# Patient Record
Sex: FEMALE | Race: WHITE | NOT HISPANIC OR LATINO | Employment: FULL TIME | URBAN - METROPOLITAN AREA
[De-identification: names, ages, dates, MRNs, and addresses within clinical notes are randomized per-mention and may not be internally consistent; named-entity substitution may affect disease eponyms.]

---

## 2023-07-11 ENCOUNTER — HOSPITAL ENCOUNTER (EMERGENCY)
Facility: HOSPITAL | Age: 28
Discharge: HOME | End: 2023-07-11
Attending: EMERGENCY MEDICINE
Payer: COMMERCIAL

## 2023-07-11 ENCOUNTER — APPOINTMENT (EMERGENCY)
Dept: CT IMAGING | Facility: HOSPITAL | Age: 28
End: 2023-07-11
Payer: COMMERCIAL

## 2023-07-11 ENCOUNTER — APPOINTMENT (EMERGENCY)
Dept: ULTRASOUND IMAGING | Facility: HOSPITAL | Age: 28
End: 2023-07-11
Payer: COMMERCIAL

## 2023-07-11 VITALS
DIASTOLIC BLOOD PRESSURE: 80 MMHG | SYSTOLIC BLOOD PRESSURE: 144 MMHG | OXYGEN SATURATION: 97 % | RESPIRATION RATE: 18 BRPM | TEMPERATURE: 98.3 F | HEART RATE: 86 BPM

## 2023-07-11 DIAGNOSIS — R10.9 RIGHT FLANK PAIN: Primary | ICD-10-CM

## 2023-07-11 LAB
ALBUMIN SERPL BCP-MCNC: 4.8 G/DL (ref 3.5–5)
ALP SERPL-CCNC: 64 U/L (ref 34–104)
ALT SERPL W P-5'-P-CCNC: 12 U/L (ref 7–52)
ANION GAP SERPL CALCULATED.3IONS-SCNC: 10 MMOL/L
AST SERPL W P-5'-P-CCNC: 19 U/L (ref 13–39)
BASOPHILS # BLD AUTO: 0.02 THOUSANDS/ÂΜL (ref 0–0.1)
BASOPHILS NFR BLD AUTO: 0 % (ref 0–1)
BILIRUB SERPL-MCNC: 0.45 MG/DL (ref 0.2–1)
BILIRUB UR QL STRIP: NEGATIVE
BUN SERPL-MCNC: 13 MG/DL (ref 5–25)
CALCIUM SERPL-MCNC: 9.8 MG/DL (ref 8.4–10.2)
CHLORIDE SERPL-SCNC: 103 MMOL/L (ref 96–108)
CLARITY UR: CLEAR
CO2 SERPL-SCNC: 23 MMOL/L (ref 21–32)
COLOR UR: COLORLESS
CREAT SERPL-MCNC: 0.84 MG/DL (ref 0.6–1.3)
EOSINOPHIL # BLD AUTO: 0.07 THOUSAND/ÂΜL (ref 0–0.61)
EOSINOPHIL NFR BLD AUTO: 1 % (ref 0–6)
ERYTHROCYTE [DISTWIDTH] IN BLOOD BY AUTOMATED COUNT: 12.6 % (ref 11.6–15.1)
EXT PREGNANCY TEST URINE: NEGATIVE
EXT. CONTROL: NORMAL
GFR SERPL CREATININE-BSD FRML MDRD: 96 ML/MIN/1.73SQ M
GLUCOSE SERPL-MCNC: 91 MG/DL (ref 65–140)
GLUCOSE UR STRIP-MCNC: NEGATIVE MG/DL
HCT VFR BLD AUTO: 40.6 % (ref 34.8–46.1)
HGB BLD-MCNC: 14.5 G/DL (ref 11.5–15.4)
HGB UR QL STRIP.AUTO: NEGATIVE
IMM GRANULOCYTES # BLD AUTO: 0.03 THOUSAND/UL (ref 0–0.2)
IMM GRANULOCYTES NFR BLD AUTO: 0 % (ref 0–2)
KETONES UR STRIP-MCNC: NEGATIVE MG/DL
LEUKOCYTE ESTERASE UR QL STRIP: NEGATIVE
LIPASE SERPL-CCNC: 15 U/L (ref 11–82)
LYMPHOCYTES # BLD AUTO: 2.76 THOUSANDS/ÂΜL (ref 0.6–4.47)
LYMPHOCYTES NFR BLD AUTO: 27 % (ref 14–44)
MCH RBC QN AUTO: 31.5 PG (ref 26.8–34.3)
MCHC RBC AUTO-ENTMCNC: 35.7 G/DL (ref 31.4–37.4)
MCV RBC AUTO: 88 FL (ref 82–98)
MONOCYTES # BLD AUTO: 0.63 THOUSAND/ÂΜL (ref 0.17–1.22)
MONOCYTES NFR BLD AUTO: 6 % (ref 4–12)
NEUTROPHILS # BLD AUTO: 6.91 THOUSANDS/ÂΜL (ref 1.85–7.62)
NEUTS SEG NFR BLD AUTO: 66 % (ref 43–75)
NITRITE UR QL STRIP: NEGATIVE
NRBC BLD AUTO-RTO: 0 /100 WBCS
PH UR STRIP.AUTO: 6 [PH]
PLATELET # BLD AUTO: 396 THOUSANDS/UL (ref 149–390)
PMV BLD AUTO: 9.2 FL (ref 8.9–12.7)
POTASSIUM SERPL-SCNC: 3.6 MMOL/L (ref 3.5–5.3)
PROT SERPL-MCNC: 8.4 G/DL (ref 6.4–8.4)
PROT UR STRIP-MCNC: NEGATIVE MG/DL
RBC # BLD AUTO: 4.61 MILLION/UL (ref 3.81–5.12)
SODIUM SERPL-SCNC: 136 MMOL/L (ref 135–147)
SP GR UR STRIP.AUTO: 1 (ref 1–1.03)
UROBILINOGEN UR STRIP-ACNC: <2 MG/DL
WBC # BLD AUTO: 10.42 THOUSAND/UL (ref 4.31–10.16)

## 2023-07-11 PROCEDURE — 80053 COMPREHEN METABOLIC PANEL: CPT

## 2023-07-11 PROCEDURE — 76705 ECHO EXAM OF ABDOMEN: CPT

## 2023-07-11 PROCEDURE — 81003 URINALYSIS AUTO W/O SCOPE: CPT | Performed by: EMERGENCY MEDICINE

## 2023-07-11 PROCEDURE — G1004 CDSM NDSC: HCPCS

## 2023-07-11 PROCEDURE — 99284 EMERGENCY DEPT VISIT MOD MDM: CPT

## 2023-07-11 PROCEDURE — 36415 COLL VENOUS BLD VENIPUNCTURE: CPT

## 2023-07-11 PROCEDURE — 83690 ASSAY OF LIPASE: CPT

## 2023-07-11 PROCEDURE — 74177 CT ABD & PELVIS W/CONTRAST: CPT

## 2023-07-11 PROCEDURE — 81025 URINE PREGNANCY TEST: CPT

## 2023-07-11 PROCEDURE — 85025 COMPLETE CBC W/AUTO DIFF WBC: CPT

## 2023-07-11 RX ADMIN — IOHEXOL 100 ML: 350 INJECTION, SOLUTION INTRAVENOUS at 21:51

## 2023-07-11 NOTE — ED PROVIDER NOTES
History  Chief Complaint   Patient presents with   • Flank Pain     Patient presents for abdominal and flank pain starting a couple weeks ago. Noticed recently that there was blood in her urine. Also complains of frequency. Patient is a 42-year-old female with no significant PMH that presents to the emergency department with right upper quadrant abdominal pain and right flank pain present for the last 2 weeks. She reports that last night, after eating hamburgers her pain became significantly worse, she felt mildly lightheaded, and felt nauseated. Nausea and lightheadedness resolved today, however she is still feeling right upper quadrant pain. She denies any urgency, or dysuria but does report frequency and hematuria that started today. She denies chest pain, shortness of breath, vomiting, or fevers. She takes no medications other than a daily prenatal vitamin for general.  She denies any abnormal vaginal discharge or bleeding and last menstrual period was June 21. She denies any recent illnesses or injuries. Prior to Admission Medications   Prescriptions Last Dose Informant Patient Reported? Taking?   etonogestrel-ethinyl estradiol (NuvaRing) 0.12-0.015 MG/24HR vaginal ring   No No   Sig: Insert vaginally and leave in place for 3 consecutive weeks, then remove for 1 week. Facility-Administered Medications: None       Past Medical History:   Diagnosis Date   • Allergic rhinitis    • No pertinent past medical history    • Scoliosis        Past Surgical History:   Procedure Laterality Date   • NOSE SURGERY      2015       Family History   Problem Relation Age of Onset   • Anemia Mother    • Glaucoma Father      I have reviewed and agree with the history as documented.     E-Cigarette/Vaping   • E-Cigarette Use Never User      E-Cigarette/Vaping Substances   • Nicotine No    • THC No    • CBD No    • Flavoring No    • Other No    • Unknown No      Social History     Tobacco Use   • Smoking status: Never   • Smokeless tobacco: Never   Vaping Use   • Vaping Use: Never used   Substance Use Topics   • Alcohol use: Yes     Comment: social   • Drug use: No        Review of Systems   Constitutional: Negative for chills and fever. Eyes: Negative for pain and visual disturbance. Respiratory: Negative for cough and shortness of breath. Cardiovascular: Negative for chest pain and palpitations. Gastrointestinal: Positive for abdominal pain (RUQ) and nausea (resolved). Negative for constipation, diarrhea and vomiting. Genitourinary: Positive for frequency and hematuria. Negative for dysuria and urgency. Musculoskeletal: Negative for arthralgias and back pain. Skin: Negative for color change and rash. Neurological: Positive for light-headedness (mild, resolved). Negative for dizziness, seizures, syncope and headaches. All other systems reviewed and are negative. Physical Exam  ED Triage Vitals [07/11/23 1914]   Temperature Pulse Respirations Blood Pressure SpO2   98.3 °F (36.8 °C) 91 18 144/80 100 %      Temp Source Heart Rate Source Patient Position - Orthostatic VS BP Location FiO2 (%)   Oral Monitor Lying Right arm --      Pain Score       --             Orthostatic Vital Signs  Vitals:    07/11/23 1914 07/11/23 1922 07/11/23 2115   BP: 144/80 144/80    Pulse: 91 96 86   Patient Position - Orthostatic VS: Lying         Physical Exam  Vitals and nursing note reviewed. Constitutional:       General: She is not in acute distress. Appearance: Normal appearance. She is well-developed. She is not ill-appearing. HENT:      Head: Normocephalic and atraumatic. Right Ear: External ear normal.      Left Ear: External ear normal.      Mouth/Throat:      Pharynx: Oropharynx is clear. No oropharyngeal exudate or posterior oropharyngeal erythema. Eyes:      General:         Right eye: No discharge. Left eye: No discharge. Extraocular Movements: Extraocular movements intact. Conjunctiva/sclera: Conjunctivae normal.   Cardiovascular:      Rate and Rhythm: Normal rate and regular rhythm. Pulses: Normal pulses. Heart sounds: Normal heart sounds. No murmur heard. Pulmonary:      Effort: Pulmonary effort is normal. No respiratory distress. Breath sounds: Normal breath sounds. No wheezing, rhonchi or rales. Abdominal:      General: Abdomen is flat. Palpations: Abdomen is soft. Tenderness: There is abdominal tenderness (mild, RUQ). There is right CVA tenderness (mild). There is no left CVA tenderness, guarding or rebound. Musculoskeletal:         General: No swelling or deformity. Normal range of motion. Cervical back: Normal range of motion and neck supple. No tenderness. Skin:     General: Skin is warm and dry. Capillary Refill: Capillary refill takes less than 2 seconds. Neurological:      Mental Status: She is alert.          ED Medications  Medications   iohexol (OMNIPAQUE) 350 MG/ML injection (SINGLE-DOSE) 100 mL (100 mL Intravenous Given 7/11/23 2151)       Diagnostic Studies  Results Reviewed     Procedure Component Value Units Date/Time    UA w Reflex to Microscopic w Reflex to Culture [[id number]] Collected: 07/11/23 2043    Lab Status: Final result Specimen: Urine, Clean Catch Updated: 07/11/23 2103     Color, UA Colorless     Clarity, UA Clear     Specific Gravity, UA 1.003     pH, UA 6.0     Leukocytes, UA Negative     Nitrite, UA Negative     Protein, UA Negative mg/dl      Glucose, UA Negative mg/dl      Ketones, UA Negative mg/dl      Urobilinogen, UA <2.0 mg/dl      Bilirubin, UA Negative     Occult Blood, UA Negative    Comprehensive metabolic panel [[id number]] Collected: 07/11/23 1932    Lab Status: Final result Specimen: Blood from Arm, Right Updated: 07/11/23 1957     Sodium 136 mmol/L      Potassium 3.6 mmol/L      Chloride 103 mmol/L      CO2 23 mmol/L      ANION GAP 10 mmol/L      BUN 13 mg/dL      Creatinine 0.84 mg/dL Glucose 91 mg/dL      Calcium 9.8 mg/dL      AST 19 U/L      ALT 12 U/L      Alkaline Phosphatase 64 U/L      Total Protein 8.4 g/dL      Albumin 4.8 g/dL      Total Bilirubin 0.45 mg/dL      eGFR 96 ml/min/1.73sq m     Narrative:      National Kidney Disease Foundation guidelines for Chronic Kidney Disease (CKD):   •  Stage 1 with normal or high GFR (GFR > 90 mL/min/1.73 square meters)  •  Stage 2 Mild CKD (GFR = 60-89 mL/min/1.73 square meters)  •  Stage 3A Moderate CKD (GFR = 45-59 mL/min/1.73 square meters)  •  Stage 3B Moderate CKD (GFR = 30-44 mL/min/1.73 square meters)  •  Stage 4 Severe CKD (GFR = 15-29 mL/min/1.73 square meters)  •  Stage 5 End Stage CKD (GFR <15 mL/min/1.73 square meters)  Note: GFR calculation is accurate only with a steady state creatinine    Lipase [[id number]]  (Normal) Collected: 07/11/23 1932    Lab Status: Final result Specimen: Blood from Arm, Right Updated: 07/11/23 1957     Lipase 15 u/L     CBC and differential [[id number]]  (Abnormal) Collected: 07/11/23 1932    Lab Status: Final result Specimen: Blood from Arm, Right Updated: 07/11/23 1947     WBC 10.42 Thousand/uL      RBC 4.61 Million/uL      Hemoglobin 14.5 g/dL      Hematocrit 40.6 %      MCV 88 fL      MCH 31.5 pg      MCHC 35.7 g/dL      RDW 12.6 %      MPV 9.2 fL      Platelets 513 Thousands/uL      nRBC 0 /100 WBCs      Neutrophils Relative 66 %      Immat GRANS % 0 %      Lymphocytes Relative 27 %      Monocytes Relative 6 %      Eosinophils Relative 1 %      Basophils Relative 0 %      Neutrophils Absolute 6.91 Thousands/µL      Immature Grans Absolute 0.03 Thousand/uL      Lymphocytes Absolute 2.76 Thousands/µL      Monocytes Absolute 0.63 Thousand/µL      Eosinophils Absolute 0.07 Thousand/µL      Basophils Absolute 0.02 Thousands/µL     POCT pregnancy, urine [[id number]]  (Normal) Resulted: 07/11/23 1936    Lab Status: Final result Updated: 07/11/23 1936     EXT Preg Test, Ur Negative     Control Valid CT abdomen pelvis with contrast   Final Result by MALINI Ya MD (07/11 2256)      No acute intra-abdominal pathology. Workstation performed: [id number]          right upper quadrant   Final Result by Evi Casanova DO (07/11 2100)      No acute process seen; normal appearing exam.      Workstation performed: [id number]               Procedures  Procedures      ED Course                                       Medical Decision Making  27F with no significant PMH presents to the emergency department with right upper quadrant abdominal pain and right flank pain present for the last 2 weeks. She developed nausea and worsening of pain last night after eating burgers. On physical exam, patient has mild tenderness to palpation of the right upper quadrant of the abdomen, mild right CVA tenderness, and otherwise normal physical exam.  Laboratory evaluation including CBC, CMP, lipase, urine pregnancy test, and UA all of which are within normal limits other than mildly elevated WBC of 10.4. Right upper quadrant ultrasound reveals no acute process. CT scan of the abdomen pelvis with contrast reveals no acute intra-abdominal pathology. Patient reports that pain is not severe and refuses any pain medications at this time. She is encouraged to call her primary care provider to schedule an appointment for further evaluation and treatment of symptoms, especially if they do not improve. Low suspicion for appendicitis, cholecystitis, hepatic abnormality, pancreatitis, or pregnancy causing pain based on labs and imaging. In the absence of concerning findings on physical exam, laboratory evaluation, or imaging patient is appropriate for discharge at this time. Return precautions are discussed with the patient and they demonstrate understanding of the plan. The patient's questions are all answered to the their satisfaction and the patient is discharged home.       Amount and/or Complexity of Data Reviewed  Radiology: ordered. Risk  Prescription drug management. Disposition  Final diagnoses:   Right flank pain     Time reflects when diagnosis was documented in both MDM as applicable and the Disposition within this note     Time User Action Codes Description Comment    7/11/2023 11:08 PM Daylene Klinefelter Add [R10.9] Right flank pain       ED Disposition     ED Disposition   Discharge    Condition   Stable    Date/Time   Tue Jul 11, 2023 11:07 PM    Comment   Alex Stacy discharge to home/self care. Follow-up Information     Follow up With Specialties Details Why Contact Info Additional [address] Family Medicine Call  To schedule an appointment for further evaluation and treatment of acute and chronic medical conditions [address] [address]  Formerly [facility] [address] [facility], [place], [place], [address]    [address] [facility] Emergency Medicine Go to  If symptoms worsen [address] [facility] Emergency Department, [place], [place], [address]          Discharge Medication List as of 7/11/2023 11:10 PM      CONTINUE these medications which have NOT CHANGED    Details   etonogestrel-ethinyl estradiol (NuvaRing) 0.12-0.015 MG/24HR vaginal ring Insert vaginally and leave in place for 3 consecutive weeks, then remove for 1 week., Normal           No discharge procedures on file. PDMP Review     None           ED Provider  Attending physically available and evaluated Jordin Spine. I managed the patient along with the ED Attending.     Electronically Signed by         Jerel Castorena,   07/12/23 0107

## 2023-07-12 NOTE — ED ATTENDING ATTESTATION
7/11/2023  INava DO, saw and evaluated the patient. I have discussed the patient with the resident/non-physician practitioner and agree with the resident's/non-physician practitioner's findings, Plan of Care, and MDM as documented in the resident's/non-physician practitioner's note, except where noted. All available labs and Radiology studies were reviewed. I was present for key portions of any procedure(s) performed by the resident/non-physician practitioner and I was immediately available to provide assistance. At this point I agree with the current assessment done in the Emergency Department. I have conducted an independent evaluation of this patient a history and physical is as follows:    20-year-old female coming into the ED for evaluation of right-sided abdominal and flank pain, intermittent for the past 2 weeks. He has been worse with some foods. She has had some nausea but no vomiting. PE:  The patient is well appearing, non-toxic, in NAD. Head: normocephalic, atraumatic. HEENT: mucous membranes moist.  Lungs: CTA b/l, no resp distress. Heart: RRR. No M/R/G. Abdomen: RUQ tenderness, R CVA tenderness, ND, no R/R/G. Neuro: CN2-12 intact, GCS 15. Cap refill < 2 sec, skin warm and dry. No rashes or lesions. ED work-up, normal right upper quadrant ultrasound, unremarkable CT abdomen pelvis with IV contrast, labs and urine also negative. Patient stable for discharge home, differential diagnosis, possibly gastritis or ulcers, musculoskeletal pain, less likely cholecystitis, pancreatitis with normal imaging.     ED Course         Critical Care Time  Procedures

## 2023-07-12 NOTE — DISCHARGE INSTRUCTIONS
Read the attached information about abdominal pain for more details and reasons to return to the emergency department    Return to the emergency department if you have significant difficulty breathing, or you notice blood in the stool, vomit, or urine.

## 2023-07-17 DIAGNOSIS — Z30.015 ENCOUNTER FOR INITIAL PRESCRIPTION OF VAGINAL RING HORMONAL CONTRACEPTIVE: ICD-10-CM

## 2023-07-17 RX ORDER — ETONOGESTREL AND ETHINYL ESTRADIOL .12; .015 MG/D; MG/D
RING VAGINAL
Qty: 3 EACH | Refills: 1 | Status: SHIPPED | OUTPATIENT
Start: 2023-07-17

## 2023-10-18 ENCOUNTER — TELEPHONE (OUTPATIENT)
Dept: OBGYN CLINIC | Facility: CLINIC | Age: 28
End: 2023-10-18

## 2023-10-18 NOTE — TELEPHONE ENCOUNTER
Patient called, she was suppose to see you today for a visit. She had a couple questions so the  transferred her to the nurse line. 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 She wanted to know if this was true and if so she wanted to stop the ring. She has a family hx of breast cancer in her grandmother so hearing that information made her nervous.  She believes she does not have the BRACA gene

## 2023-11-17 ENCOUNTER — HOSPITAL ENCOUNTER (OUTPATIENT)
Dept: RADIOLOGY | Facility: HOSPITAL | Age: 28
Discharge: HOME | End: 2023-11-17
Payer: COMMERCIAL

## 2023-11-17 DIAGNOSIS — R10.11 RUQ PAIN: ICD-10-CM

## 2023-11-17 PROCEDURE — G1004 CDSM NDSC: HCPCS

## 2023-11-17 PROCEDURE — A9537 TC99M MEBROFENIN: HCPCS

## 2023-11-17 PROCEDURE — 78227 HEPATOBIL SYST IMAGE W/DRUG: CPT

## 2023-11-17 RX ORDER — SINCALIDE 5 UG/5ML
INJECTION, POWDER, LYOPHILIZED, FOR SOLUTION INTRAVENOUS
Status: COMPLETED
Start: 2023-11-17 | End: 2023-11-17

## 2023-11-17 RX ORDER — WATER 10 ML/10ML
INJECTION INTRAMUSCULAR; INTRAVENOUS; SUBCUTANEOUS
Status: COMPLETED
Start: 2023-11-17 | End: 2023-11-17

## 2023-11-17 RX ORDER — SINCALIDE 5 UG/5ML
0.02 INJECTION, POWDER, LYOPHILIZED, FOR SOLUTION INTRAVENOUS
Status: DISCONTINUED | OUTPATIENT
Start: 2023-11-17 | End: 2023-11-18 | Stop reason: HOSPADM

## 2023-11-17 RX ADMIN — WATER 5 ML: 1 INJECTION INTRAMUSCULAR; INTRAVENOUS; SUBCUTANEOUS at 09:01

## 2023-11-17 RX ADMIN — SINCALIDE 1.5 MCG: 5 INJECTION, POWDER, LYOPHILIZED, FOR SOLUTION INTRAVENOUS at 09:01

## 2024-01-08 DIAGNOSIS — Z30.015 ENCOUNTER FOR INITIAL PRESCRIPTION OF VAGINAL RING HORMONAL CONTRACEPTIVE: ICD-10-CM

## 2024-01-08 RX ORDER — ETONOGESTREL AND ETHINYL ESTRADIOL VAGINAL RING .015; .12 MG/D; MG/D
1 RING VAGINAL
Qty: 3 EACH | Refills: 1 | Status: SHIPPED | OUTPATIENT
Start: 2024-01-08

## 2024-01-08 NOTE — TELEPHONE ENCOUNTER
Pt called requesting a refill of her nuva ring. Pt currently is passed her insertion date by a day. Pt would like it sent to the pharmacy on file. Pt was originally seeing Dr. Corbett but has her annual scheduled with [facility] 1/31/24.

## 2024-01-10 ENCOUNTER — NURSE TRIAGE (OUTPATIENT)
Dept: OTHER | Facility: OTHER | Age: 29
End: 2024-01-10

## 2024-01-11 NOTE — TELEPHONE ENCOUNTER
"Regarding: Nuva Ring question  ----- Message from Lesley Marquez sent at 1/10/2024  6:18 PM EST -----  \"I use the Nuva ring but I was about 3 days late getting it, do I need to wait until my next period or can I use it now?'    "

## 2024-01-11 NOTE — TELEPHONE ENCOUNTER
"Reason for Disposition  • Caller has medicine question only, adult not sick, AND triager answers question    Answer Assessment - Initial Assessment Questions  1. NAME of MEDICATION: \"What medicine are you calling about?\"      NuvaRing  2. QUESTION: \"What is your question?\" (e.g., medication refill, side effect)      Is it okay to put in on Sunday or should I wait until my next cycle    Protocols used: Medication Question Call-ADULT-    "

## 2024-01-31 ENCOUNTER — ANNUAL EXAM (OUTPATIENT)
Dept: OBGYN CLINIC | Facility: CLINIC | Age: 29
End: 2024-01-31
Payer: COMMERCIAL

## 2024-01-31 VITALS
HEIGHT: 62 IN | DIASTOLIC BLOOD PRESSURE: 80 MMHG | BODY MASS INDEX: 28.71 KG/M2 | SYSTOLIC BLOOD PRESSURE: 122 MMHG | WEIGHT: 156 LBS

## 2024-01-31 DIAGNOSIS — Z12.4 CERVICAL CANCER SCREENING: ICD-10-CM

## 2024-01-31 DIAGNOSIS — Z01.419 WELL WOMAN EXAM WITH ROUTINE GYNECOLOGICAL EXAM: ICD-10-CM

## 2024-01-31 DIAGNOSIS — Z12.4 SCREENING FOR MALIGNANT NEOPLASM OF CERVIX: ICD-10-CM

## 2024-01-31 DIAGNOSIS — Z30.015 ENCOUNTER FOR INITIAL PRESCRIPTION OF VAGINAL RING HORMONAL CONTRACEPTIVE: ICD-10-CM

## 2024-01-31 DIAGNOSIS — Z12.39 ENCOUNTER FOR SCREENING BREAST EXAMINATION: ICD-10-CM

## 2024-01-31 DIAGNOSIS — Z01.419 ENCOUNTER FOR WELL WOMAN EXAM: Primary | ICD-10-CM

## 2024-01-31 DIAGNOSIS — Z12.4 ENCOUNTER FOR SCREENING FOR MALIGNANT NEOPLASM OF CERVIX: ICD-10-CM

## 2024-01-31 PROCEDURE — 99395 PREV VISIT EST AGE 18-39: CPT | Performed by: PHYSICIAN ASSISTANT

## 2024-01-31 RX ORDER — LORATADINE 10 MG/1
10 TABLET ORAL DAILY
COMMUNITY

## 2024-01-31 RX ORDER — ETONOGESTREL AND ETHINYL ESTRADIOL VAGINAL RING .015; .12 MG/D; MG/D
1 RING VAGINAL
Qty: 3 EACH | Refills: 4 | Status: SHIPPED | OUTPATIENT
Start: 2024-01-31

## 2024-01-31 NOTE — PROGRESS NOTES
Assessment/Plan:    No problem-specific Assessment & Plan notes found for this encounter.       Diagnoses and all orders for this visit:    Encounter for well woman exam    Encounter for screening breast examination    Cervical cancer screening    Screening for malignant neoplasm of cervix  -     Cancel: IGP, CtNg, rfx Aptima HPV ASCU  -     IGP, CtNg, rfx Aptima HPV ASCU    Encounter for screening for malignant neoplasm of cervix  -     Cancel: Liquid-based pap, screening    Well woman exam with routine gynecological exam  -     Cancel: Liquid-based pap, screening  -     Cancel: IGP, CtNg, rfx Aptima HPV ASCU  -     IGP, CtNg, rfx Aptima HPV ASCU    Encounter for initial prescription of vaginal ring hormonal contraceptive  -     etonogestrel-ethinyl estradiol (EluRyng) 0.12-0.015 MG/24HR vaginal ring; Insert 1 each into the vagina every 28 days Insert vaginally and leave in place for 3 consecutive weeks, then remove for 1 week.    Other orders  -     loratadine (CLARITIN) 10 mg tablet; Take 10 mg by mouth daily          Subjective:      Patient ID: Cale Stacy is a 28 y.o. female.    Pt presents for her annual exam today--  She has no gyn complaints  She has regular bleeding  no pelvic pain  On Nuva Ring  Bowel and bladder are regular  No breast concerns today  +family h/o Breast Ca    pap today.    Rx Nuva Ring  Start PNV  OPK discussed        The following portions of the patient's history were reviewed and updated as appropriate: allergies, current medications, past family history, past medical history, past social history, past surgical history, and problem list.    Review of Systems   Constitutional:  Negative for chills, fever and unexpected weight change.   HENT:  Negative for ear pain and sore throat.    Eyes:  Negative for pain and visual disturbance.   Respiratory:  Negative for cough and shortness of breath.    Cardiovascular:  Negative for chest pain and palpitations.   Gastrointestinal:  Negative  "for abdominal pain, blood in stool, constipation, diarrhea and vomiting.   Genitourinary: Negative.  Negative for dysuria and hematuria.   Musculoskeletal:  Negative for arthralgias and back pain.   Skin:  Negative for color change and rash.   Neurological:  Negative for seizures and syncope.   All other systems reviewed and are negative.        Objective:      /80 (BP Location: Right arm, Patient Position: Sitting, Cuff Size: Standard)   Ht 5' 2\" (1.575 m)   Wt 70.8 kg (156 lb)   LMP 01/01/2024 (Approximate)   BMI 28.53 kg/m²          Physical Exam  Vitals and nursing note reviewed.   Constitutional:       Appearance: Normal appearance. She is well-developed.   HENT:      Head: Normocephalic and atraumatic.   Chest:   Breasts:     Right: No inverted nipple, mass, nipple discharge or skin change.      Left: No inverted nipple, mass, nipple discharge or skin change.   Abdominal:      Palpations: Abdomen is soft.   Genitourinary:     General: Normal vulva.      Exam position: Supine.      Labia:         Right: No rash, tenderness or lesion.         Left: No rash, tenderness or lesion.       Vagina: Normal.      Cervix: No cervical motion tenderness, discharge or friability.      Uterus: Normal.       Adnexa:         Right: No mass, tenderness or fullness.          Left: No mass, tenderness or fullness.     Musculoskeletal:      Cervical back: Normal range of motion.   Lymphadenopathy:      Lower Body: No right inguinal adenopathy. No left inguinal adenopathy.   Neurological:      Mental Status: She is alert.           "

## 2024-02-04 LAB
C TRACH RRNA CVX QL NAA+PROBE: NEGATIVE
CYTOLOGIST CVX/VAG CYTO: NORMAL
DX ICD CODE: NORMAL
N GONORRHOEA RRNA CVX QL NAA+PROBE: NEGATIVE
OTHER STN SPEC: NORMAL
OTHER STN SPEC: NORMAL
PATH REPORT.FINAL DX SPEC: NORMAL
SL AMB NOTE:: NORMAL
SL AMB SPECIMEN ADEQUACY: NORMAL
SL AMB TEST METHODOLOGY: NORMAL

## 2024-04-02 ENCOUNTER — NURSE TRIAGE (OUTPATIENT)
Age: 29
End: 2024-04-02

## 2024-04-02 NOTE — TELEPHONE ENCOUNTER
"Patient called states she is almost 5 weeks pregnant and experiencing mild abdominal cramping on and off. Patient states feels like period cramps, but occur mostly with BM. Patient denies any vaginal bldg or unilateral pain. Care advice reviewed. Patient aware to call back if symptoms worsen or experiences any vaginal bleeding, unilateral pain, or mod-severe abdominal pain. Patient verbalized understanding. No further questions at this time.     Reason for Disposition   MILD abdominal pain (e.g., doesn't interfere with normal activities)    Answer Assessment - Initial Assessment Questions  1. LOCATION: \"Where does it hurt?\"       Feels like period cramps  2. RADIATION: \"Does the pain shoot anywhere else?\" (e.g., chest, back, shoulder)      While having a BM radiates to rectum  3. ONSET: \"When did the pain begin?\" (e.g., minutes, hours or days ago)       Been ongoing since haven't gotten period. Comes and goes. Mainly comes   4. ONSET: \"Gradual or sudden onset?\"      gradual  5. PATTERN \"Does the pain come and go, or has it been constant since it started?\"       Comes and goes  6. SEVERITY: \"How bad is the pain?\" \"What does it keep you from doing?\"  (e.g., Scale 1-10; mild, moderate, or severe)    - MILD (1-3): doesn't interfere with normal activities, abdomen soft and not tender to touch     - MODERATE (4-7): interferes with normal activities or awakens from sleep, tender to touch     - SEVERE (8-10): excruciating pain, doubled over, unable to do any normal activities      3-4/10   7. RECURRENT SYMPTOM: \"Have you ever had this type of stomach pain before?\" If Yes, ask: \"When was the last time?\" and \"What happened that time?\"       Typically when having period  8. CAUSE: \"What do you think is causing the stomach pain?\"      unknown  9. RELIEVING/AGGRAVATING FACTORS: \"What makes it better or worse?\" (e.g., antacids, bowel movement, movement)      Bowel movement makes worse  10. OTHER SYMPTOMS: \"Has there been any " "vaginal bleeding, fever, vomiting, diarrhea, or urine problems?\"        denies  11. CHEKO: \"What date are you expecting to deliver?\"        LMP 2/28/24    Protocols used: Pregnancy - Abdominal Pain Less Than 20 Weeks EGA-ADULT-[place]    "

## 2024-05-06 ENCOUNTER — ULTRASOUND (OUTPATIENT)
Dept: OBGYN CLINIC | Facility: CLINIC | Age: 29
End: 2024-05-06
Payer: COMMERCIAL

## 2024-05-06 VITALS
HEIGHT: 62 IN | WEIGHT: 156.2 LBS | BODY MASS INDEX: 28.74 KG/M2 | SYSTOLIC BLOOD PRESSURE: 138 MMHG | DIASTOLIC BLOOD PRESSURE: 76 MMHG

## 2024-05-06 DIAGNOSIS — N91.2 AMENORRHEA: Primary | ICD-10-CM

## 2024-05-06 PROCEDURE — 76817 TRANSVAGINAL US OBSTETRIC: CPT | Performed by: PHYSICIAN ASSISTANT

## 2024-05-06 PROCEDURE — 99214 OFFICE O/P EST MOD 30 MIN: CPT | Performed by: PHYSICIAN ASSISTANT

## 2024-05-06 NOTE — PROGRESS NOTES
Assessment/Plan:  - Viable IUP @ 9w 6d EGA  - CHEKO 12-3-2024  - Continue PNV  - Patient to call for concerns  - RTO 3 weeks for OB intake    Encounter Diagnosis     ICD-10-CM    1. Amenorrhea  N91.2 Ambulatory Referral to Maternal Fetal Medicine     [facility] OB < 14 weeks single or first gestation level 1              Subjective:       Patient ID: Cale Stacy [id number]        Cale Stacy is a 28 y.o.  presenting to the office for pregnancy confirmation. Patient's last menstrual period was 2024 (exact date). , placing her at 9w6d today with CHEKO of 12-3-. She is feeling ok today.         OB History    Para Term  AB Living   1 0 0 0 0 0   SAB IAB Ectopic Multiple Live Births   0 0 0 0 0      # Outcome Date GA Lbr Dominic/2nd Weight Sex Delivery Anes PTL Lv   1 Current               Obstetric Comments   Menarche 12         The following portions of the patient's history were reviewed and updated as appropriate: allergies, current medications, past family history, past medical history, past social history, past surgical history, and problem list.    Allergies:  Augmentin [amoxicillin-pot clavulanate]    Medications:    Current Outpatient Medications:     loratadine (CLARITIN) 10 mg tablet, Take 10 mg by mouth daily, Disp: , Rfl:     Prenatal Vit-Fe Fumarate-FA (PRENATAL PO), Take by mouth, Disp: , Rfl:     etonogestrel-ethinyl estradiol (EluRyng) 0.12-0.015 MG/24HR vaginal ring, Insert 1 each into the vagina every 28 days Insert vaginally and leave in place for 3 consecutive weeks, then remove for 1 week. (Patient not taking: Reported on 2024), Disp: 3 each, Rfl: 4      Review of Systems:   Review of Systems   Constitutional:  Negative for fever.   Respiratory:  Negative for shortness of breath.    Cardiovascular:  Negative for chest pain.   Gastrointestinal:  Negative for abdominal pain.   Genitourinary:  Negative for vaginal bleeding.   Skin:  Negative for rash.         "  Objective:       Visit Vitals  /76 (BP Location: Left arm, Patient Position: Sitting, Cuff Size: Standard)   Ht 5' 2\" (1.575 m)   Wt 70.9 kg (156 lb 3.2 oz)   LMP 02/27/2024 (Exact Date)   BMI 28.57 kg/m²   OB Status Pregnant   Smoking Status Never   BSA 1.72 m²        GEN: The patient was alert and oriented x3, pleasant well-appearing female in no acute distress.   PULM: nonlabored respirations  MSK: Normal gait  : WNl  Skin: warm, dry  Neuro: no focal deficits  Psych: normal affect and judgement, cooperative    Ultrasound:     Viability US     Gestational sac: present               Location: intrauterine  Yolk sac: present  Fetal pole: present               CRL: 2.87 cm = 9w4d  Cardiac activity: present               Rate: 176 bpm     Ovaries: normal appearing bilaterally  Cul de sac: absence of free fluid  Uterus: normal in appearance           Ultrasound Probe Disinfection    A transvaginal ultrasound was performed.   Prior to use, disinfection was performed with High Level Disinfection Process (Trophon)  Probe serial number [id number]: [id number] was used    Meredith Núñez PA-C  05/06/24  2:42 PM    I have spent a total time of 30 minutes on 05/06/24 in caring for this patient including Patient and family education, Documenting in the medical record, Reviewing / ordering tests, medicine, procedures  , and Obtaining or reviewing history  .        "

## 2024-05-14 ENCOUNTER — INITIAL PRENATAL (OUTPATIENT)
Dept: OBGYN CLINIC | Facility: CLINIC | Age: 29
End: 2024-05-14

## 2024-05-14 VITALS
RESPIRATION RATE: 16 BRPM | DIASTOLIC BLOOD PRESSURE: 60 MMHG | SYSTOLIC BLOOD PRESSURE: 106 MMHG | WEIGHT: 155.6 LBS | OXYGEN SATURATION: 98 % | HEIGHT: 62 IN | HEART RATE: 75 BPM | BODY MASS INDEX: 28.63 KG/M2

## 2024-05-14 DIAGNOSIS — Z3A.11 11 WEEKS GESTATION OF PREGNANCY: Primary | ICD-10-CM

## 2024-05-14 DIAGNOSIS — Z31.430 ENCNTR FEM FOR TEST FOR GENETC DIS CARRIER STAT FOR PRO MGMT: ICD-10-CM

## 2024-05-14 DIAGNOSIS — Z34.00 SUPERVISION OF NORMAL FIRST PREGNANCY, ANTEPARTUM: ICD-10-CM

## 2024-05-14 PROCEDURE — OBC

## 2024-05-14 NOTE — PATIENT INSTRUCTIONS
Congratulations!! Please review our Pregnancy Essential Guide and [facility] L&D Virtual tour from our networks website.     [facility] Pregnancy Essentials Guide  [facility] ([email or web address])     [facility] ([facility])

## 2024-05-14 NOTE — PROGRESS NOTES
OB INTAKE INTERVIEW  Patient is 28 y.o. who presents for OB intake at 11wks 0days  The father of her baby (Joni) is involved in the pregnancy.    Cale is a pleasant . She has a history of scoliosis (pt reports the thoracic region) and has not had any complications with it. She offered no concerns during today's intake. Reviewed her next appointment date, time, and what to expect.     Last Menstrual Period: 2024  Ultrasound: Measured 9 weeks 4 days on 2024  Estimated Date of Delivery: 2024 confirmed by US    PLAN-  -Prenatal labs ordered  -Referral placed for MFM  -Reviewed Genetic Testing options   -Ordered SMA/CF/HgB [place]  -Patient to contact the office with any concerns  -RTO for OB visit (pap not indicated, last completed 2024)    Signs/Symptoms of Pregnancy  Current pregnancy symptoms:   Nausea NO  Vomiting NO  Breast tenderness YES  Fatigue YES  Constipation YES, advised to increase fluids, fiber, ambulation, Colace  Headaches no  Cramping/spotting no, had cramping around 5-6 weeks but subsided  PICA cravings no    Diabetes-  Body mass index is 28.46 kg/m².  If patient has 1 or more, please order early 1 hour GTT  History of GDM no  BMI >35 no  History of PCOS or current metformin use no  History of LGA/macrosomic infant (4000g/9lbs) no    If patient has 2 or more, please order early 1 hour GTT  BMI>30 no  AMA no  First degree relative with type 2 diabetes YES  History of chronic HTN, hyperlipidemia, elevated A1C no  High risk race (, , ,  or ) no    Hypertension- if you answer yes to any of the following, please order baseline preeclampsia labs (cbc, comprehensive metabolic panel, urine protein creatinine ratio, uric acid)  History of of chronic HTN no  History of gestational HTN no  History of preeclampsia, eclampsia, or HELLP syndrome no  History of diabetes no  History of lupus, autoimmune disease, kidney disease  no    Thyroid- if yes order TSH with reflex T4  History of thyroid disease no    Bleeding Disorder or Hx of DVT-patient or first degree relative with history of. Order the following if not done previously.   (Factor V, antithrombin III, prothrombin gene mutation, protein C and S Ag, lupus anticoagulant, anticardiolipin, beta-2 glycoprotein)   no    OB/GYN-  History of abnormal pap smear no       Date of last pap smear 2024  History of HPV no  History of Herpes/HSV no  History of other STI (gonorrhea, chlamydia, trich) no  History of prior  no  History of prior  no  History of  delivery prior to 36 weeks 6 days no  History of blood transfusion no  Ok for blood transfusion YES    Substance screening-   History of tobacco use no  Currently using tobacco no  Substance Use Screen Level (N/A, LOW, HIGH) NO Risk    MRSA Screening-   Does the pt have a hx of MRSA? no    Immunizations:  Discussed Tdap vaccine YES  Discussed COVID Vaccine YES    Genetic/MFM-  Do you or your partner have a history of any of the following in yourselves or first degree relatives?  Cystic fibrosis no  Spinal muscular atrophy no  Hemoglobinopathy/Sickle Cell/Thalassemia no  Fragile X Intellectual Disability no    If yes, discuss Carrier Screening and recommend consultation with MFM/Genetic Counseling and place specific [facility] Referral for.    If no, discuss Carrier Screening being completed once in a lifetime as a standard of care lab test. Place orders for Cystic Fibrosis Gene Test ([id number]) and Spinal Muscular Atrophy DNA ([id number])      Appointment for Nuchal Translucency Ultrasound at [facility] scheduled for 2024      Interview education  [facility] Pregnancy Essentials Book reviewed, discussed and attached to their AVS YES    Nurse/Family Partnership- patient may qualify NO; referral placed NO    Prenatal lab work scripts YES  Extra labs ordered:  Iron Panel    Aspirin/Preeclampsia Screen    Risk Level Risk Factor  Recommendation   LOW Prior Uncomplicated full-term delivery no No Aspirin recommendation        MODERATE Nulliparity YES Recommend low-dose aspirin if     BMI>30 no 2 or more moderate risk factors    Family History Preeclampsia (mother/sister) no     35yr old or greater no      or Low Socioeconomic no     IVF Pregnancy  no     Personal History Risks (low birth weight, prior adverse preg outcome, >10yr preg interval) no         HIGH History of Preeclampsia no Recommend low-dose aspirin if     Multifetal gestation no 1 or more high risk factors    Chronic HTN no     Type 1 or 2 Diabetes no     Renal Disease no     Autoimmune Disease  no      Contraindications to ASA therapy:  NSAID/ ASA allergy: no  Nasal polyps: no  Asthma with history of ASA induced bronchospasm: no  Relative contraindications:  History of GI bleed: no  Active peptic ulcer disease: no  Severe hepatic dysfunction: no    Patient should be recommended to take ASA 162mg during this pregnancy from 12-36wks to lower her risk of preeclampsia: Reviewed benefits of ASA therapy with Cale. Aware that she has only one risk factor and that ASA therapy is not recommended at this time.    PN1 visit scheduled. The patient was oriented to our practice, the navigator role, reviewed delivering physicians and the [facility] for Delivery. All questions were answered.    Interviewed by: Imani Jerome RN

## 2024-05-15 ENCOUNTER — TELEPHONE (OUTPATIENT)
Age: 29
End: 2024-05-15

## 2024-05-15 NOTE — TELEPHONE ENCOUNTER
Pt calling with questions regarding obtaining lab work. RN answered questions. Pt verbalized an understanding.

## 2024-05-18 ENCOUNTER — LAB (OUTPATIENT)
Dept: LAB | Facility: CLINIC | Age: 29
End: 2024-05-18
Payer: COMMERCIAL

## 2024-05-18 DIAGNOSIS — Z31.430 ENCNTR FEM FOR TEST FOR GENETC DIS CARRIER STAT FOR PRO MGMT: ICD-10-CM

## 2024-05-18 DIAGNOSIS — Z3A.11 11 WEEKS GESTATION OF PREGNANCY: ICD-10-CM

## 2024-05-18 DIAGNOSIS — Z34.00 SUPERVISION OF NORMAL FIRST PREGNANCY, ANTEPARTUM: ICD-10-CM

## 2024-05-18 LAB
ABO GROUP BLD: NORMAL
BASOPHILS # BLD AUTO: 0.03 THOUSANDS/ÂΜL (ref 0–0.1)
BASOPHILS NFR BLD AUTO: 0 % (ref 0–1)
BLD GP AB SCN SERPL QL: NEGATIVE
EOSINOPHIL # BLD AUTO: 0.13 THOUSAND/ÂΜL (ref 0–0.61)
EOSINOPHIL NFR BLD AUTO: 1 % (ref 0–6)
ERYTHROCYTE [DISTWIDTH] IN BLOOD BY AUTOMATED COUNT: 12.9 % (ref 11.6–15.1)
FERRITIN SERPL-MCNC: 91 NG/ML (ref 11–307)
HBV SURFACE AG SER QL: NORMAL
HCT VFR BLD AUTO: 38.8 % (ref 34.8–46.1)
HCV AB SER QL: NORMAL
HGB BLD-MCNC: 13.7 G/DL (ref 11.5–15.4)
HIV 1+2 AB+HIV1 P24 AG SERPL QL IA: NORMAL
HIV 2 AB SERPL QL IA: NORMAL
HIV1 AB SERPL QL IA: NORMAL
HIV1 P24 AG SERPL QL IA: NORMAL
IMM GRANULOCYTES # BLD AUTO: 0.03 THOUSAND/UL (ref 0–0.2)
IMM GRANULOCYTES NFR BLD AUTO: 0 % (ref 0–2)
IRON SATN MFR SERPL: 43 % (ref 15–50)
IRON SERPL-MCNC: 173 UG/DL (ref 50–212)
LYMPHOCYTES # BLD AUTO: 2.4 THOUSANDS/ÂΜL (ref 0.6–4.47)
LYMPHOCYTES NFR BLD AUTO: 26 % (ref 14–44)
MCH RBC QN AUTO: 31.5 PG (ref 26.8–34.3)
MCHC RBC AUTO-ENTMCNC: 35.3 G/DL (ref 31.4–37.4)
MCV RBC AUTO: 89 FL (ref 82–98)
MONOCYTES # BLD AUTO: 0.52 THOUSAND/ÂΜL (ref 0.17–1.22)
MONOCYTES NFR BLD AUTO: 6 % (ref 4–12)
NEUTROPHILS # BLD AUTO: 6.05 THOUSANDS/ÂΜL (ref 1.85–7.62)
NEUTS SEG NFR BLD AUTO: 67 % (ref 43–75)
NRBC BLD AUTO-RTO: 0 /100 WBCS
PLATELET # BLD AUTO: 338 THOUSANDS/UL (ref 149–390)
PMV BLD AUTO: 9.1 FL (ref 8.9–12.7)
RBC # BLD AUTO: 4.35 MILLION/UL (ref 3.81–5.12)
RH BLD: POSITIVE
RUBV IGG SERPL IA-ACNC: 51.6 IU/ML
SPECIMEN EXPIRATION DATE: NORMAL
TIBC SERPL-MCNC: 407 UG/DL (ref 250–450)
TREPONEMA PALLIDUM IGG+IGM AB [PRESENCE] IN SERUM OR PLASMA BY IMMUNOASSAY: NORMAL
UIBC SERPL-MCNC: 234 UG/DL (ref 155–355)
VZV IGG SER QL IA: NORMAL
WBC # BLD AUTO: 9.16 THOUSAND/UL (ref 4.31–10.16)

## 2024-05-18 PROCEDURE — 86901 BLOOD TYPING SEROLOGIC RH(D): CPT

## 2024-05-18 PROCEDURE — 87389 HIV-1 AG W/HIV-1&-2 AB AG IA: CPT

## 2024-05-18 PROCEDURE — 86787 VARICELLA-ZOSTER ANTIBODY: CPT

## 2024-05-18 PROCEDURE — 83540 ASSAY OF IRON: CPT

## 2024-05-18 PROCEDURE — 86803 HEPATITIS C AB TEST: CPT

## 2024-05-18 PROCEDURE — 87340 HEPATITIS B SURFACE AG IA: CPT

## 2024-05-18 PROCEDURE — 83550 IRON BINDING TEST: CPT

## 2024-05-18 PROCEDURE — 86850 RBC ANTIBODY SCREEN: CPT

## 2024-05-18 PROCEDURE — 83020 HEMOGLOBIN ELECTROPHORESIS: CPT

## 2024-05-18 PROCEDURE — 86900 BLOOD TYPING SEROLOGIC ABO: CPT

## 2024-05-18 PROCEDURE — 81220 CFTR GENE COM VARIANTS: CPT

## 2024-05-18 PROCEDURE — 36415 COLL VENOUS BLD VENIPUNCTURE: CPT

## 2024-05-18 PROCEDURE — 86762 RUBELLA ANTIBODY: CPT

## 2024-05-18 PROCEDURE — 85025 COMPLETE CBC W/AUTO DIFF WBC: CPT

## 2024-05-18 PROCEDURE — 81329 SMN1 GENE DOS/DELETION ALYS: CPT

## 2024-05-18 PROCEDURE — 86780 TREPONEMA PALLIDUM: CPT

## 2024-05-18 PROCEDURE — 82728 ASSAY OF FERRITIN: CPT

## 2024-05-18 PROCEDURE — 87086 URINE CULTURE/COLONY COUNT: CPT

## 2024-05-19 LAB — BACTERIA UR CULT: NORMAL

## 2024-05-21 ENCOUNTER — ROUTINE PRENATAL (OUTPATIENT)
Dept: PERINATAL CARE | Facility: CLINIC | Age: 29
End: 2024-05-21
Payer: COMMERCIAL

## 2024-05-21 VITALS
BODY MASS INDEX: 29 KG/M2 | DIASTOLIC BLOOD PRESSURE: 60 MMHG | HEIGHT: 62 IN | WEIGHT: 157.6 LBS | HEART RATE: 75 BPM | SYSTOLIC BLOOD PRESSURE: 120 MMHG

## 2024-05-21 DIAGNOSIS — Z36.9 UNSPECIFIED ANTENATAL SCREENING: ICD-10-CM

## 2024-05-21 DIAGNOSIS — Z36.82 ENCOUNTER FOR NUCHAL TRANSLUCENCY TESTING: Primary | ICD-10-CM

## 2024-05-21 DIAGNOSIS — O36.80X0 ENCOUNTER TO DETERMINE FETAL VIABILITY OF PREGNANCY, SINGLE OR UNSPECIFIED FETUS: ICD-10-CM

## 2024-05-21 DIAGNOSIS — Z13.79 ENCOUNTER FOR OTHER SCREENING FOR GENETIC AND CHROMOSOMAL ANOMALIES: ICD-10-CM

## 2024-05-21 DIAGNOSIS — Z3A.12 12 WEEKS GESTATION OF PREGNANCY: ICD-10-CM

## 2024-05-21 DIAGNOSIS — Z33.1 PREGNANT STATE, INCIDENTAL: ICD-10-CM

## 2024-05-21 PROCEDURE — 76801 OB US < 14 WKS SINGLE FETUS: CPT | Performed by: OBSTETRICS & GYNECOLOGY

## 2024-05-21 PROCEDURE — 76813 OB US NUCHAL MEAS 1 GEST: CPT | Performed by: OBSTETRICS & GYNECOLOGY

## 2024-05-21 PROCEDURE — 99203 OFFICE O/P NEW LOW 30 MIN: CPT | Performed by: OBSTETRICS & GYNECOLOGY

## 2024-05-21 PROCEDURE — 36415 COLL VENOUS BLD VENIPUNCTURE: CPT | Performed by: OBSTETRICS & GYNECOLOGY

## 2024-05-21 NOTE — PROGRESS NOTES
"Cale presents today for a genetic screening ultrasound.  This is her first pregnancy.  She has no significant medical, surgical, substance use, or family history.  A review of systems is otherwise negative.    We discussed the options for genetic screening, including but not limited to first trimester screening, second trimester screening, combined first and second trimester screening, noninvasive prenatal screening (NIPS) for patients at high risk and diagnostic screening through the use of CVS and amniocentesis. We discussed the risks and benefits of each approach including the sensitivities and false positive rates as well as the difference between a screening test and a diagnostic test. At the conclusion of our discussion the patient elected noninvasive prenatal screening utilizing the MaterniT 21 plus test. The patient had this bloowork drawn in the office.   The results should be available in approximately 7-10 days.    The patient had questions regarding the safety of exercise in pregnancy and overall I reviewed that exercise is safe and encouraged in pregnancy as long as there are no complications such as fetal growth restriction or  labor. I reiterated that there is no evidence to suggest that a mom needs to keep her heart rate bellow a certain level for fetal well being and that moderate exercise is beneficial for both maternal and fetal well being.    We discussed follow-up in detail and I recommend an anatomy ultrasound be scheduled for 20 weeks gestation.    Thank you very much for allowing us to participate in the care of this very nice patient. Should you have any questions, please do not hesitate to contact me.    Portions of the record may have been created with voice recognition software. Occasional wrong word or \"sound a like\" substitutions may have occurred due to the inherent limitations of voice recognition software. Read the chart carefully and recognize, using context, where " substitutions have occurred.

## 2024-05-21 NOTE — PROGRESS NOTES
Patient chose to have [facility] [id number] Non-Invasive Prenatal Screen [id number] PLUS, NO SCA, NO fetal sex.  Patient choose to be billed through insurance.     Patient given brochure and is aware [facility] will contact patient's insurance and coordinate coverage.  Provided [facility] contact information. General inquiries [phone], Cost estimates [phone] and [facility] Billing [phone]. Website [email or web address].     Blood collection tubes labeled with patient identifiers (name, medical record number, and date of birth).     Filled out [facility] order form. Patient chose to have blood drawn in our office at time of visit. NIPS was drawn from left arm with a straight needle by PATRICIA Gonzalez RNC-OB . .        Maternal Fetal Medicine will have results in approximately 5-7 business days and will call patient or notify via G[employer]VIDI.  Patient aware viewing lab result online will reveal fetal sex if ordered.    Patient verbalized understanding of all instructions and no questions at this time.

## 2024-05-22 LAB
CFDNA.FET/CFDNA.TOTAL SFR FETUS: NORMAL %
CITATION REF LAB TEST: NORMAL
FET 13+18+21+X+Y ANEUP PLAS.CFDNA: NORMAL
FET CHR 21 TS PLAS.CFDNA QL: NORMAL
FET CHR 21 TS PLAS.CFDNA QL: NORMAL
FET SEX PLAS.CFDNA DOSAGE CFDNA: NORMAL
FET TS 13 RISK PLAS.CFDNA QL: NORMAL
GA EST FROM CONCEPTION DATE: NORMAL D
GESTATIONAL AGE > 9:: NORMAL
LAB DIRECTOR NAME PROVIDER: NORMAL
LAB DIRECTOR NAME PROVIDER: NORMAL
LABORATORY COMMENT REPORT: NORMAL
LIMITATIONS OF THE TEST: NORMAL
NEGATIVE PREDICTIVE VALUE: NORMAL
PERFORMANCE CHARACTERISTICS: NORMAL
POSITIVE PREDICTIVE VALUE: NORMAL
REF LAB TEST METHOD: NORMAL
SL AMB NOTE:: NORMAL
TEST PERFORMANCE INFO SPEC: NORMAL

## 2024-05-23 LAB
CITATION REF LAB TEST: NORMAL
CLINICAL INFO: NORMAL
ETHNIC BACKGROUND STATED: NORMAL
GENE DIS ANL CARRIER INTERP-IMP: NORMAL
GENE MUT TESTED BLD/T: NORMAL
HGB A MFR BLD: 2.5 % (ref 1.8–3.2)
HGB A MFR BLD: 97.5 % (ref 96.4–98.8)
HGB F MFR BLD: 0 % (ref 0–2)
HGB FRACT BLD-IMP: NORMAL
HGB S MFR BLD: 0 %
LAB DIRECTOR NAME PROVIDER: NORMAL
REASON FOR REFERRAL (NARRATIVE): NORMAL
RECOMMENDATION PATIENT DOC-IMP: NORMAL
REF LAB TEST METHOD: NORMAL
SERVICE CMNT-IMP: NORMAL
SMN1 GENE MUT ANL BLD/T: NORMAL
SPECIMEN SOURCE: NORMAL

## 2024-05-27 LAB
CFDNA.FET/CFDNA.TOTAL SFR FETUS: NORMAL %
CITATION REF LAB TEST: NORMAL
FET 13+18+21+X+Y ANEUP PLAS.CFDNA: NEGATIVE
FET CHR 21 TS PLAS.CFDNA QL: NEGATIVE
FET CHR 21 TS PLAS.CFDNA QL: NEGATIVE
FET SEX PLAS.CFDNA DOSAGE CFDNA: NORMAL
FET TS 13 RISK PLAS.CFDNA QL: NEGATIVE
GA EST FROM CONCEPTION DATE: NORMAL D
GESTATIONAL AGE > 9:: YES
LAB DIRECTOR NAME PROVIDER: NORMAL
LAB DIRECTOR NAME PROVIDER: NORMAL
LABORATORY COMMENT REPORT: NORMAL
LIMITATIONS OF THE TEST: NORMAL
NEGATIVE PREDICTIVE VALUE: NORMAL
PERFORMANCE CHARACTERISTICS: NORMAL
POSITIVE PREDICTIVE VALUE: NORMAL
REF LAB TEST METHOD: NORMAL
SL AMB NOTE:: NORMAL
TEST PERFORMANCE INFO SPEC: NORMAL

## 2024-05-28 ENCOUNTER — TELEPHONE (OUTPATIENT)
Age: 29
End: 2024-05-28

## 2024-05-28 NOTE — TELEPHONE ENCOUNTER
Patient called asking if she was allowed to do water rides at a water park while pregnant.  She was advised she is not allowed to ride any rides while pregnant.  She understood and had no additional questions or concerns.

## 2024-05-28 NOTE — RESULT ENCOUNTER NOTE
I have reviewed the results of the NIPS which are low risk.  Please call patient and notify her of these reassuring results if she has not viewed on MyChart. Please ensure she is notified of recommendation of MSAFP to be ordered and followed up through her primary Obstetrician's office.      Thank you, Ac Torres MD

## 2024-05-30 LAB
CFTR FULL MUT ANL BLD/T SEQ: NORMAL
CITATION REF LAB TEST: NORMAL
CLINICAL INFO: NORMAL
ETHNIC BACKGROUND STATED: NORMAL
GENE DIS ANL CARRIER INTERP-IMP: NORMAL
INDICATION: NORMAL
LAB DIRECTOR NAME PROVIDER: NORMAL
RECOMMENDATION PATIENT DOC-IMP: NORMAL
REF LAB TEST METHOD: NORMAL
SERVICE CMNT-IMP: NORMAL
SPECIMEN SOURCE: NORMAL

## 2024-06-04 ENCOUNTER — NURSE TRIAGE (OUTPATIENT)
Age: 29
End: 2024-06-04

## 2024-06-04 NOTE — TELEPHONE ENCOUNTER
"Pt called in stating that for the last two days she has had a decreased appetite. Then this morning she woke up around 0600, had a large bowel movement and then felt dizzy. She went back to bed until 0700 but states the dizziness continued and she vomited while on her way to work. Pt denies having any nausea/vomiting throughout the pregnancy prior. Pt states that she feels better at the moment but is going to go home and rest. Advised to maintain hydration and if her stomach tolerates it, incorporate small bland snacks. Pt aware to call back if she experiences more vomiting and cannot keep liquids down, is not urinating regularly or has other concerns/worsening symptoms. Pt thankful. Pt also states that she has a dentist appointment tomorrow and is wondering if she can have xrays done. Advised to only do them if necessary and to double shield. She verbalized understanding and is thankful.      Reason for Disposition   Nausea or vomiting    Answer Assessment - Initial Assessment Questions  1. VOMITING SEVERITY: \"How many times have you vomited in the past 24 hours?\"      - MILD:  1 - 2 times/day     - MODERATE: 3 - 5 times/day, decreased oral intake without significant weight loss or symptoms of dehydration     - SEVERE: 6 or more times/day, vomits everything or nearly everything, with significant weight loss, symptoms of dehydration       once  2. ONSET: \"When did the vomiting begin?\"       This morning  3. FLUIDS: \"What fluids or food have you vomited up today?\" \"Are you able to keep any liquids down?\"      Sipping at water  4. TREATMENT: \"What have you been doing so far to treat this?\"       nothing  5. DEHYDRATION: \"When was the last time you urinated?\" \"Are you feeling lightheaded?\" \"Weight loss?\"      This morning  6. PREGNANCY: \"How many weeks pregnant are you?\" \"How has the pregnancy been going?\"      14w0d  7. CHEKO: \"What date are you expecting to deliver?\"      12/3/24  8. MEDICATIONS: \"What medications are " "you taking?\" (e.g., prenatal vitamins, iron)      prenatal  9. OTHER SYMPTOMS: \"Do you have any other symptoms?\"      dizziness    Protocols used: Pregnancy - Morning Sickness (Nausea and Vomiting of Pregnancy)-ADULT-OH    "

## 2024-06-05 NOTE — TELEPHONE ENCOUNTER
Pt called to update from phone call yesterday. States continued vomiting after our phone call. States she vomited throughout the day and last night. Pt does not think this is pregnancy related, thinks it may be a stomach bug. Has vomited x6 in the last 24 hours, last vomited in the middle of the night. Urinated shortly prior to call, states her urination has been normal. Taking sips of water since around 0600 and ate toast which she has kept down. States she does feel better at this time and does not think she will vomit anymore. Denies fever but does report some mild nasal congestion/post nasal drip. Started to feel flutters last night, unsure if from baby or from vomiting. Advised that is good if she is starting to feel better, if it is a stomach bug they typically last 24-48 hours. Pt aware to call back if vomiting persists today or worsens. Also aware to call back if she is not able to stay hydrated, feels dizzy/lightheaded, has any pains or vaginal bleeding. Pt verbalized understanding and is thankful.

## 2024-06-06 ENCOUNTER — INITIAL PRENATAL (OUTPATIENT)
Dept: OBGYN CLINIC | Facility: CLINIC | Age: 29
End: 2024-06-06

## 2024-06-06 VITALS
WEIGHT: 154 LBS | SYSTOLIC BLOOD PRESSURE: 106 MMHG | HEIGHT: 62 IN | DIASTOLIC BLOOD PRESSURE: 64 MMHG | BODY MASS INDEX: 28.34 KG/M2

## 2024-06-06 DIAGNOSIS — Z34.00 SUPERVISION OF NORMAL FIRST PREGNANCY, ANTEPARTUM: Primary | ICD-10-CM

## 2024-06-06 DIAGNOSIS — Z3A.14 14 WEEKS GESTATION OF PREGNANCY: ICD-10-CM

## 2024-06-06 PROCEDURE — PNV: Performed by: PHYSICIAN ASSISTANT

## 2024-06-06 NOTE — PROGRESS NOTES
Patient is a 29 YO  female presenting to the office at 14.2 weeks for routine OB care.   BP: 106/64  TWG: -2lb  Fetal Movement: none felt yet    28 y.o.  female at 14w2d (Estimated Date of Delivery: 12/3/24) for PNV.      TW.726 kg (1 lb 9.6 oz)    Cramping: no  Bleeding: no  LOF: no  NT/13 week scan scheduled: yes  Anatomy scan scheduled   AFP ordered if indicated: yes  Prenatal labs complete (including Heb B, HIV): yes; date completed   Pap collected: UTD  GC collected:yes  OK to transfuse and code  Oriented to practice/delivery location.   RTO 4 weeks

## 2024-06-06 NOTE — PROGRESS NOTES
Initial prenatal visit, pap up to date(1/31/24-wnl), urine gc/chlam testing due today.    Urine dip 1+ protein/negative glucose.

## 2024-06-08 LAB
C TRACH RRNA SPEC QL NAA+PROBE: NEGATIVE
N GONORRHOEA RRNA SPEC QL NAA+PROBE: NEGATIVE

## 2024-06-21 ENCOUNTER — APPOINTMENT (OUTPATIENT)
Dept: LAB | Facility: CLINIC | Age: 29
End: 2024-06-21
Payer: COMMERCIAL

## 2024-06-21 DIAGNOSIS — Z3A.14 14 WEEKS GESTATION OF PREGNANCY: ICD-10-CM

## 2024-06-21 DIAGNOSIS — Z34.00 SUPERVISION OF NORMAL FIRST PREGNANCY, ANTEPARTUM: ICD-10-CM

## 2024-06-21 PROCEDURE — 82105 ALPHA-FETOPROTEIN SERUM: CPT

## 2024-06-21 PROCEDURE — 36415 COLL VENOUS BLD VENIPUNCTURE: CPT

## 2024-06-24 LAB
2ND TRIMESTER 4 SCREEN SERPL-IMP: NORMAL
AFP ADJ MOM SERPL: 1.35
AFP INTERP AMN-IMP: NORMAL
AFP INTERP SERPL-IMP: NORMAL
AFP INTERP SERPL-IMP: NORMAL
AFP SERPL-MCNC: 46.5 NG/ML
AGE AT DELIVERY: 28.1 YR
GA METHOD: NORMAL
GA: 16.4 WEEKS
IDDM PATIENT QL: NO
MULTIPLE PREGNANCY: NO
NEURAL TUBE DEFECT RISK FETUS: 4151 %

## 2024-07-05 ENCOUNTER — ROUTINE PRENATAL (OUTPATIENT)
Dept: OBGYN CLINIC | Facility: CLINIC | Age: 29
End: 2024-07-05

## 2024-07-05 ENCOUNTER — PATIENT MESSAGE (OUTPATIENT)
Dept: OBGYN CLINIC | Facility: CLINIC | Age: 29
End: 2024-07-05

## 2024-07-05 VITALS — DIASTOLIC BLOOD PRESSURE: 64 MMHG | WEIGHT: 161 LBS | BODY MASS INDEX: 29.45 KG/M2 | SYSTOLIC BLOOD PRESSURE: 110 MMHG

## 2024-07-05 DIAGNOSIS — Z34.02 ENCOUNTER FOR SUPERVISION OF NORMAL FIRST PREGNANCY, SECOND TRIMESTER: Primary | ICD-10-CM

## 2024-07-05 DIAGNOSIS — Z3A.18 18 WEEKS GESTATION OF PREGNANCY: ICD-10-CM

## 2024-07-05 PROCEDURE — PNV

## 2024-07-05 RX ORDER — DROSPIRENONE, ETHINYL ESTRADIOL AND LEVOMEFOLATE CALCIUM AND LEVOMEFOLATE CALCIUM 3-0.02(24)
KIT ORAL EVERY 24 HOURS
COMMUNITY

## 2024-07-05 NOTE — PROGRESS NOTES
Patient is a 29 YO  female presenting to the office at 18w3d for routine OB care.   Patient is feeling well today. We discussed sleeping positions today, pt enjoys sleeping on her stomach/back, she does use a pregnancy pillow with an abdominal cut out when sleeping on her stomach. Discussed propping herself up with pillows if sleeping on back.   US with ELIS on 24  Fetal heart rate: 140  BP: 110/64  TWlb  Fetal Movement: yes, good movement   LOF: no  VB: no  CTX: no  Reviewed precautions  Call for concerns  RTO 4 weeks

## 2024-07-08 NOTE — PATIENT COMMUNICATION
Overall how are you doing? Good!    Compliant with routine OB care appointments? yes    Have you completed your 1st trimester labs? yes    If you had NIPS with MFM, do you have a order for MSAFP? yes   Can be completed 15w-22w9d, ideally 16w-18w    Have you seen MFM and do you have your detailed US scheduled? yes    Pregnancy Education-have you had a chance to review the classes offered and registered? yes    EPDS Score 3

## 2024-07-09 ENCOUNTER — PATIENT MESSAGE (OUTPATIENT)
Dept: OBGYN CLINIC | Facility: CLINIC | Age: 29
End: 2024-07-09

## 2024-07-09 DIAGNOSIS — O26.892 PREGNANCY RELATED HIP PAIN IN SECOND TRIMESTER, ANTEPARTUM: ICD-10-CM

## 2024-07-09 DIAGNOSIS — M53.3 PAIN IN SACRUM: Primary | ICD-10-CM

## 2024-07-09 DIAGNOSIS — M25.559 PREGNANCY RELATED HIP PAIN IN SECOND TRIMESTER, ANTEPARTUM: ICD-10-CM

## 2024-07-12 NOTE — PATIENT INSTRUCTIONS

## 2024-07-17 ENCOUNTER — ROUTINE PRENATAL (OUTPATIENT)
Facility: HOSPITAL | Age: 29
End: 2024-07-17
Payer: COMMERCIAL

## 2024-07-17 VITALS
SYSTOLIC BLOOD PRESSURE: 122 MMHG | HEART RATE: 93 BPM | HEIGHT: 62 IN | BODY MASS INDEX: 30.18 KG/M2 | WEIGHT: 164 LBS | DIASTOLIC BLOOD PRESSURE: 66 MMHG

## 2024-07-17 DIAGNOSIS — Z36.3 ENCOUNTER FOR ANTENATAL SCREENING FOR MALFORMATION USING ULTRASOUND: ICD-10-CM

## 2024-07-17 DIAGNOSIS — Z36.86 ENCOUNTER FOR ANTENATAL SCREENING FOR CERVICAL LENGTH: ICD-10-CM

## 2024-07-17 DIAGNOSIS — Z3A.20 20 WEEKS GESTATION OF PREGNANCY: Primary | ICD-10-CM

## 2024-07-17 PROCEDURE — 76805 OB US >/= 14 WKS SNGL FETUS: CPT | Performed by: OBSTETRICS & GYNECOLOGY

## 2024-07-17 PROCEDURE — 99212 OFFICE O/P EST SF 10 MIN: CPT | Performed by: OBSTETRICS & GYNECOLOGY

## 2024-07-17 PROCEDURE — 76817 TRANSVAGINAL US OBSTETRIC: CPT | Performed by: OBSTETRICS & GYNECOLOGY

## 2024-07-17 NOTE — PROGRESS NOTES
The patient was seen today for an ultrasound.  Please see ultrasound report (located under Ob Procedures) for additional details.   Thank you very much for allowing us to participate in the care of this very nice patient.  Should you have any questions, please do not hesitate to contact me.     Ac Torres MD FACOG  Attending Physician, Maternal-Fetal Medicine  [facility]

## 2024-07-25 ENCOUNTER — EVALUATION (OUTPATIENT)
Dept: PHYSICAL THERAPY | Facility: CLINIC | Age: 29
End: 2024-07-25
Payer: COMMERCIAL

## 2024-07-25 DIAGNOSIS — O26.892 PREGNANCY RELATED HIP PAIN IN SECOND TRIMESTER, ANTEPARTUM: ICD-10-CM

## 2024-07-25 DIAGNOSIS — M53.3 PAIN IN SACRUM: ICD-10-CM

## 2024-07-25 DIAGNOSIS — M25.559 PREGNANCY RELATED HIP PAIN IN SECOND TRIMESTER, ANTEPARTUM: ICD-10-CM

## 2024-07-25 PROCEDURE — 97162 PT EVAL MOD COMPLEX 30 MIN: CPT

## 2024-07-25 PROCEDURE — 97110 THERAPEUTIC EXERCISES: CPT

## 2024-07-25 PROCEDURE — 97140 MANUAL THERAPY 1/> REGIONS: CPT

## 2024-07-25 NOTE — PROGRESS NOTES
PT Evaluation     Today's date: 2024  Patient name: Cale Stacy  : 1995  MRN: [id number]  Referring provider: Meredith Núñez PA-C  Dx:   Encounter Diagnosis     ICD-10-CM    1. Pain in sacrum  M53.3 Ambulatory Referral to Physical Therapy      2. Pregnancy related hip pain in second trimester, antepartum  O26.892 Ambulatory Referral to Physical Therapy    M25.559                      Assessment  Impairments: abnormal muscle firing, abnormal muscle tone, abnormal or restricted ROM, activity intolerance, impaired physical strength and lacks appropriate home exercise program    Assessment details: Cale Stacy is a 28 y.o. female who presents with pain, decreased strength, decreased ROM, and TTP at umbilicus. Due to these impairments, patient has limitations with ADL's, recreational activities, work-related activities, lifting/carrying, transfers. Patient's clinical presentation is consistent with their referring diagnosis of Pain in sacrum and Pregnancy related hip pain in second trimester, antepartum. Mechanical assessment unclear at this time with low back pain, patient given PAULINA to trial with HEP to further assess. Patient verbalized understanding of centralization vs peripheralization. Patient reported decrease LBP And pelvic discomfort with trial of K-tape to abdomen for support. POC was discussed and agreed upon with patient.  Patient was educated on: pelvic floor anatomy, Importance of body mechanics and ergonomics in regards to protecting against activities which increase IAP and pressure,  and PT exam and course of treatment.  Patient vocalized a good understanding of  POC and HEP issued. Patient would benefit from skilled physical therapy services to address their aforementioned functional limitations and progress towards prior level of function and independence with home exercise program.      Pelvic floor verbal consent and written consent signed and in chart  Patient deferred  second person in room: YES/NO        Goals  Pelvic floor short term goals: 8 weeks  - Patient will demo good form with lifting from floor to prevent low back injury with lifting performed at work  - Patient presents with good understanding of pelvic floor protective strategies to reduce intra-abdominal pressure against pelvic organs.  - Patient will be compliant with introductory HEP as prescribed.      Pelvic floor long term goals: 12 weeks   - Patient will be compliant with comprehensive home exercise program for self management of condition.   - Patient will Implement relaxation strategies on a daily basis.  - Patient will report 80 % reduction in discomfort with sit to stands                Plan  Patient would benefit from: women's health eval and skilled physical therapy  Planned modality interventions: biofeedback, cryotherapy, thermotherapy: hydrocollator packs, TENS, hydrotherapy and ultrasound    Planned therapy interventions: activity modification, abdominal trunk stabilization, manual therapy, breathing training, neuromuscular re-education, patient education, postural training, strengthening, stretching, therapeutic exercise, therapeutic activities, graded exercise, graded activity, flexibility, home exercise program, body mechanics training, behavior modification, balance/weight bearing training and IASTM    Frequency: 2x week  Plan of Care beginning date: 7/25/2024  Plan of Care expiration date: 10/17/2024  Treatment plan discussed with: patient  Plan details: HEP development, stretching, strengthening, A/AA/PROM, joint mobilizations, posture education, STM/MI as needed to reduce muscle tension, muscle reeducation, PLOC discussed and agreed upon with patient.          PT Pelvic Floor Subjective:   History of Present Illness:   Patient presents to PT for pelvic and sacral pain. Patient reports it was at its worst last week but has lessened. Patient reported it felt like her tailbone was being poked back  into her butt. Patient works at a camp and as a teacher, by the end of the day she has increased pressure and pain.   Social Support:     Lives in:  Multiple-level home    Lives with:  Spouse    Relationship status: /committed    Work status: employed full time  Diet and Exercise:    Diet:balanced nutrition    Exercise type: yoga and walking    Exercise frequency: 2-3 times per week  OB/ gyn History    Gestational History:     Number of prior pregnancies: 1    Term pregnancies: currently pregnant.  Bladder Function:      Voiding Difficulties comments:     Urinary leakage: no urine leakage    Painful urination: No    Bowel Function:     Voiding DIfficulties: constipation      Voiding DIfficulties comment:  Occasionally    Bowel frequency: multiple times a day  Sexual Function:     Sexually Active:  Sexually active  Pain:     Current pain ratin    At worst pain ratin    Onset:  Less than 1 month ago    Quality:  Sharp and burning    Aggravating factors:  Unprovoked    Duration of symptoms:  More than 1 day  Patient Goals:     Patient goals for therapy:  Decreased pain, improved comfort, improved pain management, improved sleep, relaxation, return to sport/leisure activities and return to work      Objective    Posture: Lumbar lordosis is slightly increased in standing. There is no lateral shift.        Lumbar AROM limitations:  (*=  Pain)  Lumbar flexion: Min nettles   Lumbar extension: Min nettles   R side glide:  WNL  L side glide:  WNL    Mechanical Asessment: pre-test symtpoms include 2/10 pain in low back   Repeated Flexion in sitting (RFISit): NT  Repeated Extension in Standing (PAULINA):1x10 = NE   Repeated Extension in Lying (REIL): NT        Strength:     Right  Left  Knee ext  4+/5  4+/5  Knee flex  4/5  4/5  Ankle DF  5/5  5/5  Ankle PF  5/5  5/5      Hip flexion:  5/5  5/5  Hip abduction  4+/5  4+/5  Hip ER   4+/5  4+/5  Hip IR   4/5  4/5    Special tests:   SIJ compression test: neg   SIJ  distracition: neg    Tenderness/Palpation: no TTP at L/S or SIJ    Sensation: denies any numbness or tingling      Flexibility:limited hip ROM noted            Precautions:   Past Medical History:   Diagnosis Date    Allergic rhinitis     Scoliosis     Varicella     vaccinated     SOC: 7/25/2024  POC Expiration: 10/17/2024  Daily Treatment Log:  Date 7/25/2024       Visit # 1       Auth         Auth exp        Manual 8'       K-tape to abdomen for support  Applied by EG                There Exer 15'       Figure 4 stretch         LTR                                         Pt edu  See assessment section        HEP Created and discussed        There Activ                                                        NMReed        PAULINA         T/S rotation         Std hip elevation                         Modalities                                HEP:   Access Code: [email or web address]  URL: [email or web address]  Date: 07/25/2024  Prepared by: Yolis Osborne    Exercises  - Supine Lower Trunk Rotation  - 1 x daily - 7 x weekly - 1 sets - 10 reps - 5 sec hold  - Supine Figure 4 Piriformis Stretch  - 1 x daily - 7 x weekly - 3 reps - 20 sec  hold  - Standing Lumbar Extension  - 2-3 x daily - 7 x weekly - 1 sets - 10 reps  - Seated Thoracic Flexion and Rotation with Arms Crossed  - 1 x daily - 7 x weekly - 3 sets - 10 reps

## 2024-07-31 ENCOUNTER — APPOINTMENT (OUTPATIENT)
Dept: PHYSICAL THERAPY | Facility: CLINIC | Age: 29
End: 2024-07-31
Payer: COMMERCIAL

## 2024-08-01 ENCOUNTER — ROUTINE PRENATAL (OUTPATIENT)
Dept: OBGYN CLINIC | Facility: CLINIC | Age: 29
End: 2024-08-01

## 2024-08-01 VITALS — BODY MASS INDEX: 30.73 KG/M2 | DIASTOLIC BLOOD PRESSURE: 64 MMHG | SYSTOLIC BLOOD PRESSURE: 116 MMHG | WEIGHT: 168 LBS

## 2024-08-01 DIAGNOSIS — Z34.02 PRENATAL CARE, FIRST PREGNANCY IN SECOND TRIMESTER: Primary | ICD-10-CM

## 2024-08-01 DIAGNOSIS — Z36.9 ANTENATAL SCREENING ENCOUNTER: ICD-10-CM

## 2024-08-01 PROCEDURE — PNV: Performed by: PHYSICIAN ASSISTANT

## 2024-08-05 ENCOUNTER — TELEPHONE (OUTPATIENT)
Dept: PHYSICAL THERAPY | Facility: CLINIC | Age: 29
End: 2024-08-05

## 2024-08-05 ENCOUNTER — OFFICE VISIT (OUTPATIENT)
Dept: PHYSICAL THERAPY | Facility: CLINIC | Age: 29
End: 2024-08-05
Payer: COMMERCIAL

## 2024-08-05 DIAGNOSIS — M25.559 PREGNANCY RELATED HIP PAIN IN SECOND TRIMESTER, ANTEPARTUM: ICD-10-CM

## 2024-08-05 DIAGNOSIS — O26.892 PREGNANCY RELATED HIP PAIN IN SECOND TRIMESTER, ANTEPARTUM: ICD-10-CM

## 2024-08-05 DIAGNOSIS — M53.3 PAIN IN SACRUM: Primary | ICD-10-CM

## 2024-08-05 PROCEDURE — 97110 THERAPEUTIC EXERCISES: CPT

## 2024-08-05 NOTE — TELEPHONE ENCOUNTER
PT called patient regarding 330p however as PT was leaving message patient entered clinic.     Yolis Osborne PT, DPT   License #  [id number]

## 2024-08-05 NOTE — PROGRESS NOTES
Daily Note     Today's date: 2024  Patient name: Cale Stacy  : 1995  MRN: [id number]  Referring provider: Meredith Núñez PA-C  Dx:   Encounter Diagnosis     ICD-10-CM    1. Pain in sacrum  M53.3       2. Pregnancy related hip pain in second trimester, antepartum  O26.892     M25.559                      Subjective: Patients reports since edu on belly belts for pregnancy she went out and bought one and finds with use of this she has no pain. Patient and PT discussed removing future appts and she may return in future should pain return or other concerns arise. Will leave episode open at this time while patient trials belly belt for 2 weeks.       Objective: See treatment diary below      Assessment: Tolerated treatment well with chaitanya-partum and some post partum edu offered with handouts regarding perineal massage and breathing during labor and various labor positions. Patient also given handout on proper form with lifting baby post partum to prevent injury. Patient would benefit from continued PT      Plan: Continue per plan of care.        Precautions:   Past Medical History:   Diagnosis Date    Allergic rhinitis     Scoliosis     Varicella     vaccinated     SOC: 2024  POC Expiration: 10/17/2024  Daily Treatment Log:  Date 2024      Visit # 1 2      Auth         Auth exp        Manual 8'       K-tape to abdomen for support  Applied by EG                There Exer 15' 35'      Figure 4 stretch         LTR                                         Pt edu  See assessment section  See assessment section with discussion on belly belt       HEP Created and discussed        There Activ                                                        Doni        PAULINA         T/S rotation         Std hip elevation                         Modalities                                HEP:   Access Code: [email or web address]  URL: [email or web address]  Date: 2024  Prepared by: Yolis  Giannabelcurt    Exercises  - Supine Lower Trunk Rotation  - 1 x daily - 7 x weekly - 1 sets - 10 reps - 5 sec hold  - Supine Figure 4 Piriformis Stretch  - 1 x daily - 7 x weekly - 3 reps - 20 sec  hold  - Standing Lumbar Extension  - 2-3 x daily - 7 x weekly - 1 sets - 10 reps  - Seated Thoracic Flexion and Rotation with Arms Crossed  - 1 x daily - 7 x weekly - 3 sets - 10 reps

## 2024-08-07 ENCOUNTER — APPOINTMENT (OUTPATIENT)
Dept: PHYSICAL THERAPY | Facility: CLINIC | Age: 29
End: 2024-08-07
Payer: COMMERCIAL

## 2024-08-07 NOTE — PROGRESS NOTES
28 y.o.  female at 23w1d (Estimated Date of Delivery: 12/3/24) for PNV.    Pre-Naomi Vitals      Flowsheet Row Most Recent Value   Prenatal Assessment    Movement Present   Prenatal Vitals    Blood Pressure 116/64   Weight - Scale 76.2 kg (168 lb)   Urine Albumin/Glucose    Dilation/Effacement/Station    Vaginal Drainage    Edema           TW.443 kg (12 lb)    Leakage of fluid: no  Vaginal bleeding: no  Contractions/Cramping: no  Fetal movement: yes  Occ back pain  PT not a huge help  Heat, stretches and belly binder discussed  + b/l carpal tunnel--splints discussed  Rx 28 week labs  O pos    RTO in 4 weeks.

## 2024-08-12 ENCOUNTER — APPOINTMENT (OUTPATIENT)
Dept: PHYSICAL THERAPY | Facility: CLINIC | Age: 29
End: 2024-08-12
Payer: COMMERCIAL

## 2024-08-14 ENCOUNTER — APPOINTMENT (OUTPATIENT)
Dept: PHYSICAL THERAPY | Facility: CLINIC | Age: 29
End: 2024-08-14
Payer: COMMERCIAL

## 2024-08-17 ENCOUNTER — APPOINTMENT (OUTPATIENT)
Dept: LAB | Facility: CLINIC | Age: 29
End: 2024-08-17
Payer: COMMERCIAL

## 2024-08-17 ENCOUNTER — CLINICAL SUPPORT (OUTPATIENT)
Age: 29
End: 2024-08-17

## 2024-08-17 DIAGNOSIS — Z34.02 PRENATAL CARE, FIRST PREGNANCY IN SECOND TRIMESTER: ICD-10-CM

## 2024-08-17 DIAGNOSIS — Z36.9 ANTENATAL SCREENING ENCOUNTER: ICD-10-CM

## 2024-08-17 DIAGNOSIS — Z32.2 ENCOUNTER FOR CHILDBIRTH INSTRUCTION: Primary | ICD-10-CM

## 2024-08-17 LAB
ERYTHROCYTE [DISTWIDTH] IN BLOOD BY AUTOMATED COUNT: 13.5 % (ref 11.6–15.1)
GLUCOSE 1H P 50 G GLC PO SERPL-MCNC: 110 MG/DL (ref 70–134)
HCT VFR BLD AUTO: 33.3 % (ref 36.5–46.1)
HGB BLD-MCNC: 11.5 G/DL (ref 12–15.4)
MCH RBC QN AUTO: 32.2 PG (ref 26.8–34.3)
MCHC RBC AUTO-ENTMCNC: 34.5 G/DL (ref 31.4–37.4)
MCV RBC AUTO: 93 FL (ref 82–98)
PLATELET # BLD AUTO: 312 THOUSANDS/UL (ref 149–390)
PMV BLD AUTO: 9.2 FL (ref 8.9–12.7)
RBC # BLD AUTO: 3.57 MILLION/UL (ref 3.88–5.12)
TREPONEMA PALLIDUM IGG+IGM AB [PRESENCE] IN SERUM OR PLASMA BY IMMUNOASSAY: NORMAL
WBC # BLD AUTO: 12.25 THOUSAND/UL (ref 4.31–10.16)

## 2024-08-17 PROCEDURE — 82950 GLUCOSE TEST: CPT

## 2024-08-17 PROCEDURE — 85027 COMPLETE CBC AUTOMATED: CPT

## 2024-08-17 PROCEDURE — 36415 COLL VENOUS BLD VENIPUNCTURE: CPT

## 2024-08-17 PROCEDURE — 86780 TREPONEMA PALLIDUM: CPT

## 2024-08-19 ENCOUNTER — NURSE TRIAGE (OUTPATIENT)
Age: 29
End: 2024-08-19

## 2024-08-19 ENCOUNTER — APPOINTMENT (OUTPATIENT)
Dept: PHYSICAL THERAPY | Facility: CLINIC | Age: 29
End: 2024-08-19
Payer: COMMERCIAL

## 2024-08-19 NOTE — TELEPHONE ENCOUNTER
"Pt called in stating that she woke up feeling \"off\" this morning. While standing doing things she developed a mild LLQ pain that seems to come and go. States she sat down and ate a snack, pain went away. Denies at this time. Denies any vaginal bleeding, abnormal discharge, LOF. Baby has been active. States she has had 3 bouts of diarrhea today, believes she may have been slightly backed up. Advised pt to continue to monitor and call back if the pain starts again and becomes severe, she has any vaginal bleeding, LOF, abnormal discharge, decreased fetal movement or other concerns/questions. Pt verbalized understanding and is thankful. Pt also questioning infant CPR class, contact number/email provided. Pt thankful.     Reason for Disposition   MILD abdominal pain (e.g., doesn't interfere with normal activities)    Answer Assessment - Initial Assessment Questions  1. LOCATION: \"Where does it hurt?\"       LLQ pain  2. RADIATION: \"Does the pain shoot anywhere else?\" (e.g., chest, back)      denies  3. ONSET: \"When did the pain begin?\" (Minutes, hours or days ago)       Earlier today  4. ONSET: \"Gradual or sudden onset?\"      gradual  5. PATTERN: \"Does the pain come and go, or has it been constant since it started?\"       Comes and goes  6. SEVERITY: \"How bad is the pain?\" \"What does it keep you from doing?\"  (e.g., Scale 1-10; mild, moderate, or severe)    - MILD (1-3): doesn't interfere with normal activities, abdomen soft and not tender to touch     - MODERATE (4-7): interferes with normal activities or awakens from sleep, tender to touch     - SEVERE (8-10): excruciating pain, doubled over, unable to do any normal activities      mild  7. RECURRENT SYMPTOM: \"Have you ever had this type of stomach pain before?\" If Yes, ask: \"When was the last time?\" and \"What happened that time?\"       denies  8. CAUSE: \"What do you think is causing the stomach pain?      unsure  9. RELIEVING/AGGRAVATING FACTORS: \"What makes it better " "or worse?\" (e.g., antacids, bowel movement, movement)      Went away after sitting down relaxing  10. FETAL MOVEMENT: \"Has the baby's movement decreased or changed significantly from normal?\"        denies  11. OTHER SYMPTOMS: \"Has there been any vaginal bleeding, fever, vomiting, diarrhea, or urine problems?\"        Diarrhea x3  12. CHEKO: \"What date are you expecting to deliver?\"        12/3/24    Protocols used: Pregnancy - Abdominal Pain Greater Than 20 Weeks EGA-ADULT-[place]    "

## 2024-08-21 ENCOUNTER — APPOINTMENT (OUTPATIENT)
Dept: PHYSICAL THERAPY | Facility: CLINIC | Age: 29
End: 2024-08-21
Payer: COMMERCIAL

## 2024-08-25 ENCOUNTER — CLINICAL SUPPORT (OUTPATIENT)
Age: 29
End: 2024-08-25

## 2024-08-25 DIAGNOSIS — Z32.2 ENCOUNTER FOR CHILDBIRTH INSTRUCTION: Primary | ICD-10-CM

## 2024-08-30 ENCOUNTER — ROUTINE PRENATAL (OUTPATIENT)
Dept: OBGYN CLINIC | Facility: CLINIC | Age: 29
End: 2024-08-30

## 2024-08-30 VITALS — WEIGHT: 172 LBS | DIASTOLIC BLOOD PRESSURE: 70 MMHG | SYSTOLIC BLOOD PRESSURE: 122 MMHG | BODY MASS INDEX: 31.46 KG/M2

## 2024-08-30 DIAGNOSIS — Z3A.26 26 WEEKS GESTATION OF PREGNANCY: ICD-10-CM

## 2024-08-30 DIAGNOSIS — Z34.02 ENCOUNTER FOR SUPERVISION OF NORMAL FIRST PREGNANCY, SECOND TRIMESTER: Primary | ICD-10-CM

## 2024-08-30 PROCEDURE — PNV: Performed by: PHYSICIAN ASSISTANT

## 2024-08-30 NOTE — PROGRESS NOTES
Pt is here for her 26w prenatal. Pt doing well no concerns. Pt has been having some breast leakage.

## 2024-08-30 NOTE — PROGRESS NOTES
Patient is a 29 YO  female presenting to the office at 26.3 weeks for routine OB care.   BP: 122/70  TWlb  Fetal Movement: yes good movement   LOF: no  VB: no  CTX: no  Labs completed  Reviewed precautions  Call for concerns  RTO 2 weeks

## 2024-09-10 ENCOUNTER — NURSE TRIAGE (OUTPATIENT)
Age: 29
End: 2024-09-10

## 2024-09-10 NOTE — TELEPHONE ENCOUNTER
"28 weeks 0 day, EDC 12/3/2024  Tripped on dog bone on stairs, her knee bent back and she caught herself, felt a pull on the right side of belly button. Describes as a  \"twinge\" that she felt right away. Patient is now at work, feeling few more twinges now. Describes as pulling sensation on right side of belly button, does not radiate. Feels she stretched out a bit when she caught herself from falling. No contractions, the feeling has come and gone few times now. Did not hit abdomen. No bleeding, no leaking fluid, no back pain. Twisted ankle but that is fine now as well. Baby has moved and she feeling kicking now. 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 Advised to rest, hydrate and monitor if any changes and call back and questions or concerns.   ES[facility] DR. Mp Mejia. Thank you   Reason for Disposition   Recent fall and no injury    Answer Assessment - Initial Assessment Questions  1. MECHANISM: \"How did the fall happen?\"      On stairs         3. ONSET: \"When did the fall happen?\" (e.g., minutes, hours, or days ago)      today  4. LOCATION: \"What part of the body hit the ground?\" (e.g., back, buttocks, head, hips, knees, hands, head, stomach)      none  5. INJURY: \"Did you hurt (injure) yourself when you fell?\" If Yes, ask: \"What did you injure? Tell me more about this?\" (e.g., body area; type of injury; pain severity)\"      Ankle twisted and felt twinge in abdomen  6. PAIN: \"Is there any pain?\" If Yes, ask: \"How bad is the pain?\" (e.g., Scale 1-10; or mild,   moderate, severe)    - NONE (0): no pain    - MILD (1-3): doesn't interfere with normal activities     - MODERATE (4-7): interferes with normal activities or awakens from sleep     - SEVERE (8-10): excruciating pain, unable to do any normal activities       Twinge sensation near belly button  7. SIZE: For cuts, bruises, or swelling, ask: \"How large is it?\" (e.g., inches or centimeters)       denies  8. PREGNANCY: \"Is there any chance you are " "pregnant?\" \"When was your last menstrual period?\"      28 weeks 0 days  9. OTHER SYMPTOMS: \"Do you have any other symptoms?\" (e.g., dizziness, fever, weakness; new onset or worsening).       denies  10. CAUSE: \"What do you think caused the fall (or falling)?\" (e.g., tripped, dizzy spell)        Tripped on dog bone    Protocols used: Falls and Falling-ADULT-[place]    "

## 2024-09-13 ENCOUNTER — ROUTINE PRENATAL (OUTPATIENT)
Dept: OBGYN CLINIC | Facility: CLINIC | Age: 29
End: 2024-09-13
Payer: COMMERCIAL

## 2024-09-13 VITALS — DIASTOLIC BLOOD PRESSURE: 62 MMHG | SYSTOLIC BLOOD PRESSURE: 110 MMHG | BODY MASS INDEX: 32.01 KG/M2 | WEIGHT: 175 LBS

## 2024-09-13 DIAGNOSIS — Z23 NEED FOR TDAP VACCINATION: ICD-10-CM

## 2024-09-13 DIAGNOSIS — Z34.02 ENCOUNTER FOR SUPERVISION OF NORMAL FIRST PREGNANCY, SECOND TRIMESTER: Primary | ICD-10-CM

## 2024-09-13 DIAGNOSIS — Z3A.28 28 WEEKS GESTATION OF PREGNANCY: ICD-10-CM

## 2024-09-13 PROCEDURE — 90715 TDAP VACCINE 7 YRS/> IM: CPT

## 2024-09-13 PROCEDURE — 90471 IMMUNIZATION ADMIN: CPT

## 2024-09-13 PROCEDURE — PNV

## 2024-09-13 NOTE — PROGRESS NOTES
Patient is a 27 YO  female presenting to the office at 28w3d for routine OB care.   Patient is feeling well today.   Fetal heart rate: 140  BP: 110/62  TWlb  Fetal Movement: yes, good movement   LOF: no  VB: no  CTX: no  Discussed third trimester teaching  Reviewed fetal kick counts, normal FM  Reviewed signs of PTL  Reviewed red folder, consents, birth plan  Delivery consent obtained   Breastfeeding: plans to breastfeed  Breast Pump: previously ordered  TDAP: would like today  FLU Vaccine: discussed  COVID Vaccine: previously received   Rhogam: not required, O+  28 Week Labs: completed  RTO 2 weeks for routine OB F/U

## 2024-09-13 NOTE — PROGRESS NOTES
Pt is here for her 29w prenatal. Redfolder,tdap due. Breast pump already done through [employer]. urine dip is neg/neg

## 2024-09-27 ENCOUNTER — ROUTINE PRENATAL (OUTPATIENT)
Dept: OBGYN CLINIC | Facility: CLINIC | Age: 29
End: 2024-09-27
Payer: COMMERCIAL

## 2024-09-27 VITALS — BODY MASS INDEX: 33.11 KG/M2 | WEIGHT: 181 LBS | DIASTOLIC BLOOD PRESSURE: 68 MMHG | SYSTOLIC BLOOD PRESSURE: 112 MMHG

## 2024-09-27 DIAGNOSIS — Z3A.30 30 WEEKS GESTATION OF PREGNANCY: ICD-10-CM

## 2024-09-27 DIAGNOSIS — Z23 NEED FOR INFLUENZA VACCINATION: Primary | ICD-10-CM

## 2024-09-27 DIAGNOSIS — Z34.03 ENCOUNTER FOR SUPERVISION OF NORMAL FIRST PREGNANCY, THIRD TRIMESTER: ICD-10-CM

## 2024-09-27 PROCEDURE — 90471 IMMUNIZATION ADMIN: CPT | Performed by: PHYSICIAN ASSISTANT

## 2024-09-27 PROCEDURE — PNV: Performed by: PHYSICIAN ASSISTANT

## 2024-09-27 PROCEDURE — 90656 IIV3 VACC NO PRSV 0.5 ML IM: CPT | Performed by: PHYSICIAN ASSISTANT

## 2024-09-27 NOTE — PROGRESS NOTES
Patient is a 29 YO  female presenting to the office at 30.3 weeks for routine OB care.   BP: 112/68  TWlb  Fetal Movement: yes good movement   LOF: no  VB: no  CTX: no  Flu shot received today  Reviewed precautions  Call for concerns  RTO 2 weeks

## 2024-09-30 ENCOUNTER — CLINICAL SUPPORT (OUTPATIENT)
Age: 29
End: 2024-09-30

## 2024-09-30 DIAGNOSIS — Z32.2 ENCOUNTER FOR CHILDBIRTH INSTRUCTION: Primary | ICD-10-CM

## 2024-10-04 ENCOUNTER — CLINICAL SUPPORT (OUTPATIENT)
Dept: POSTPARTUM | Facility: CLINIC | Age: 29
End: 2024-10-04

## 2024-10-04 DIAGNOSIS — Z32.2 ENCOUNTER FOR CHILDBIRTH INSTRUCTION: Primary | ICD-10-CM

## 2024-10-07 ENCOUNTER — CLINICAL SUPPORT (OUTPATIENT)
Dept: POSTPARTUM | Facility: CLINIC | Age: 29
End: 2024-10-07

## 2024-10-07 DIAGNOSIS — Z32.2 ENCOUNTER FOR CHILDBIRTH INSTRUCTION: Primary | ICD-10-CM

## 2024-10-11 ENCOUNTER — ROUTINE PRENATAL (OUTPATIENT)
Dept: OBGYN CLINIC | Facility: CLINIC | Age: 29
End: 2024-10-11
Payer: COMMERCIAL

## 2024-10-11 VITALS — BODY MASS INDEX: 33.36 KG/M2 | DIASTOLIC BLOOD PRESSURE: 64 MMHG | WEIGHT: 182.4 LBS | SYSTOLIC BLOOD PRESSURE: 108 MMHG

## 2024-10-11 DIAGNOSIS — Z3A.32 32 WEEKS GESTATION OF PREGNANCY: ICD-10-CM

## 2024-10-11 DIAGNOSIS — Z34.03 ENCOUNTER FOR SUPERVISION OF NORMAL FIRST PREGNANCY, THIRD TRIMESTER: ICD-10-CM

## 2024-10-11 DIAGNOSIS — Z23 NEED FOR VIRAL IMMUNIZATION: Primary | ICD-10-CM

## 2024-10-11 PROCEDURE — 90678 RSV VACC PREF BIVALENT IM: CPT | Performed by: OBSTETRICS & GYNECOLOGY

## 2024-10-11 PROCEDURE — 90471 IMMUNIZATION ADMIN: CPT | Performed by: OBSTETRICS & GYNECOLOGY

## 2024-10-11 PROCEDURE — PNV: Performed by: OBSTETRICS & GYNECOLOGY

## 2024-10-11 NOTE — PROGRESS NOTES
28 y.o.  female at 32w3d (Estimated Date of Delivery: 12/3/24) for PNV.    Pre-Naomi Vitals      Flowsheet Row Most Recent Value   Prenatal Assessment    Fetal Heart Rate 130   Movement Present   Prenatal Vitals    Blood Pressure 108/64   Weight - Scale 82.7 kg (182 lb 6.4 oz)   Urine Albumin/Glucose    Dilation/Effacement/Station    Vaginal Drainage    Edema           TW kg (26 lb 6.4 oz)    Leakage of fluid: no  Vaginal bleeding: no  Contractions/Cramping: no  Fetal movement: yes    RSV vaccine given today     RTO in 2 weeks.

## 2024-10-12 ENCOUNTER — CLINICAL SUPPORT (OUTPATIENT)
Dept: POSTPARTUM | Facility: CLINIC | Age: 29
End: 2024-10-12

## 2024-10-12 DIAGNOSIS — Z32.2 ENCOUNTER FOR CHILDBIRTH INSTRUCTION: Primary | ICD-10-CM

## 2024-10-22 ENCOUNTER — ROUTINE PRENATAL (OUTPATIENT)
Dept: OBGYN CLINIC | Facility: CLINIC | Age: 29
End: 2024-10-22

## 2024-10-22 VITALS — WEIGHT: 183.4 LBS | SYSTOLIC BLOOD PRESSURE: 122 MMHG | DIASTOLIC BLOOD PRESSURE: 80 MMHG | BODY MASS INDEX: 33.54 KG/M2

## 2024-10-22 DIAGNOSIS — Z34.03 ENCOUNTER FOR SUPERVISION OF NORMAL FIRST PREGNANCY, THIRD TRIMESTER: Primary | ICD-10-CM

## 2024-10-22 DIAGNOSIS — Z3A.34 34 WEEKS GESTATION OF PREGNANCY: ICD-10-CM

## 2024-10-22 PROCEDURE — PNV: Performed by: OBSTETRICS & GYNECOLOGY

## 2024-10-22 NOTE — PROGRESS NOTES
Cale Grbben     29 y.o.  female at 34w0d (Estimated Date of Delivery: 12/3/24) for PNV.    Pre-Naomi Vitals      Flowsheet Row Most Recent Value   Prenatal Assessment    Fetal Heart Rate 140   Movement Present   Prenatal Vitals    Blood Pressure 122/80   Weight - Scale 83.2 kg (183 lb 6.4 oz)   Urine Albumin/Glucose    Dilation/Effacement/Station    Vaginal Drainage    Edema           TW.4 kg (27 lb 6.4 oz)    Leakage of fluid: no  Vaginal bleeding: no  Contractions/Cramping: yes, occas light cramping  Fetal movement: yes    GBS  next visit.   Wants cervical check next visit.   Desires spontaneous labor.     RTO in 1 weeks.           UA neg/neg

## 2024-10-24 ENCOUNTER — PATIENT MESSAGE (OUTPATIENT)
Dept: OBGYN CLINIC | Facility: CLINIC | Age: 29
End: 2024-10-24

## 2024-10-24 ENCOUNTER — NURSE TRIAGE (OUTPATIENT)
Age: 29
End: 2024-10-24

## 2024-10-24 NOTE — TELEPHONE ENCOUNTER
"Patient sent a [employer] message in regarding some pressure in pregnancy. Called patient back, she is 34w2d. She states after her visit on 10/22 she noticed an increase in lower pelvic pressure and discomfort in her back mainly on the left side. She feels like baby is sitting lower in the pelvis. She denies any pain, contractions, LOF or bleeding. Discussed that the increase in pressure could certainly be from babies positioning and gestational age.  Discussed Tylenol 1000 mg every 8 hours, heating pad to back, stretching and icy hot/biofreeze. 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 She tried PT and didn't like it so she stopped after 1 visit. If the symptoms worsens or develops new symptoms she is to call us back. Do you recommend anything else at this time?      ESC- Dr. Monsivais \"That's perfect - thank you! \"    Called patient back, she is aware of providers recommendation       Reason for Disposition   Back pain    Answer Assessment - Initial Assessment Questions  1. ONSET: \"When did the pain begin?\"       Feeling pressure in the lower pelvic area and back on left side   2. LOCATION: \"Where does it hurt?\" (upper, mid or lower back)      Lower back and pelvic area  3. SEVERITY: \"How bad is the pain?\"  (e.g., Scale 1-10; mild, moderate, or severe)      Uncomfortable   4. PATTERN: \"Is the pain constant?\" (e.g., yes, no; constant, intermittent)       Constant pressure   5. RADIATION: \"Does the pain shoot into your legs or somewhere else?\"      Denies   6. CAUSE:  \"What do you think is causing the back pain?\"       Babies position   8. MEDICINES: \"What have you taken so far for the pain?\" (e.g., nothing, acetaminophen)      Denies   10. OTHER SYMPTOMS: \"Do you have any other symptoms?\" (e.g., abdomen pain, blood in urine, fever, fluid leaking from vagina, pain with urination)        Denies   11. CHEKO: \"What date are you expecting to deliver?\"        34w2d    Protocols used: " Pregnancy - Back Pain-Adult-[place]

## 2024-10-24 NOTE — TELEPHONE ENCOUNTER
"Regardin wks increased pressure  ----- Message from Autumn BENJAMIN sent at 10/24/2024 11:28 AM EDT -----  Patient wrote in with increase in pressure since PN visit 2 days ago- 34 wks    \"Hi Dr. Owen! I know I just saw you two days ago BUT I feel like I have been really feeling an increased amount of pressure on my back and down low. Baby just feels like it's pushing harder! I'm not sure if there is anything I can do to help the pressure. I am sure this is normal but like everything else I just wanted to ask to be safe. 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 Can I apply heat? Or is there anything else that I can do. Or is this the part where we just have to get through\"    "

## 2024-10-29 ENCOUNTER — TELEPHONE (OUTPATIENT)
Age: 29
End: 2024-10-29

## 2024-10-29 NOTE — TELEPHONE ENCOUNTER
Pt called stating her first day she will start ( not work ) on 11/30. Her disability l;eave will begin 11/29, and 11/30 as her first day of leave.

## 2024-11-06 PROBLEM — O99.213 OBESITY AFFECTING PREGNANCY IN THIRD TRIMESTER: Status: ACTIVE | Noted: 2024-11-06

## 2024-11-06 PROBLEM — Z3A.36 36 WEEKS GESTATION OF PREGNANCY: Status: ACTIVE | Noted: 2024-05-21

## 2024-11-07 ENCOUNTER — ROUTINE PRENATAL (OUTPATIENT)
Dept: OBGYN CLINIC | Facility: CLINIC | Age: 29
End: 2024-11-07

## 2024-11-07 VITALS — WEIGHT: 183.4 LBS | BODY MASS INDEX: 33.54 KG/M2 | DIASTOLIC BLOOD PRESSURE: 78 MMHG | SYSTOLIC BLOOD PRESSURE: 114 MMHG

## 2024-11-07 DIAGNOSIS — Z3A.36 36 WEEKS GESTATION OF PREGNANCY: ICD-10-CM

## 2024-11-07 DIAGNOSIS — Z34.03 ENCOUNTER FOR SUPERVISION OF NORMAL FIRST PREGNANCY, THIRD TRIMESTER: ICD-10-CM

## 2024-11-07 DIAGNOSIS — O99.213 OBESITY AFFECTING PREGNANCY IN THIRD TRIMESTER, UNSPECIFIED OBESITY TYPE: Primary | ICD-10-CM

## 2024-11-07 PROCEDURE — PNV: Performed by: OBSTETRICS & GYNECOLOGY

## 2024-11-07 NOTE — PROGRESS NOTES
"Cale Stacy       29 y.o.  female at 36w2d (Estimated Date of Delivery: 12/3/24) for PNV.    Pre- Vitals      Flowsheet Row Most Recent Value   Prenatal Assessment    Fetal Heart Rate 145   Movement Present   Prenatal Vitals    Blood Pressure 114/78   Weight - Scale 83.2 kg (183 lb 6.4 oz)   Urine Albumin/Glucose    Dilation/Effacement/Station    Cervical Dilation 0  [soft]   Cervical Effacement 0   Fetal Station -4   Vaginal Drainage    Edema           TW.4 kg (27 lb 6.4 oz)    Leakage of fluid: no  Vaginal bleeding: no  Contractions/Cramping: no  Fetal movement: yes    GBS done: Yes  PCN allergy: No  Chlamydia/gonorrhea swab done: Yes  Delivery plan: await spontaneous labor, she called her insurance and they do not cover elective inductions so she would like a membrane sweep when able  She has been feeling more pressure and feels that \"the baby will come any minute\" from the pressure, SVE closed cervix.    Labor precautions given.  FKC reviewed.     RTO in 1 weeks.    "

## 2024-11-08 ENCOUNTER — TELEPHONE (OUTPATIENT)
Dept: OBGYN CLINIC | Facility: CLINIC | Age: 29
End: 2024-11-08

## 2024-11-08 NOTE — TELEPHONE ENCOUNTER
.Overall how are you feeling? good    Compliant with routine OB appointments? yes    Have you completed your 3rd trimester lab work? yes    Have you reviewed the contents of 3rd trimester folder from office? yes   Have you decided on a pediatrician? [facility]         If no, reviewed practices and transferred call to [phone] to set up appointment with pediatric office.   Questions on paperwork to go back to office? no   Questions on the baby birth certificate and photography forms? no      Send link for the Hospital Readiness Video via [employer]

## 2024-11-11 LAB
C TRACH RRNA SPEC QL NAA+PROBE: NEGATIVE
GP B STREP DNA SPEC QL NAA+PROBE: NEGATIVE
N GONORRHOEA RRNA SPEC QL NAA+PROBE: NEGATIVE

## 2024-11-15 ENCOUNTER — ROUTINE PRENATAL (OUTPATIENT)
Dept: OBGYN CLINIC | Facility: CLINIC | Age: 29
End: 2024-11-15

## 2024-11-15 VITALS — DIASTOLIC BLOOD PRESSURE: 80 MMHG | WEIGHT: 188 LBS | BODY MASS INDEX: 34.39 KG/M2 | SYSTOLIC BLOOD PRESSURE: 120 MMHG

## 2024-11-15 DIAGNOSIS — Z3A.37 37 WEEKS GESTATION OF PREGNANCY: ICD-10-CM

## 2024-11-15 DIAGNOSIS — Z34.03 ENCOUNTER FOR SUPERVISION OF NORMAL FIRST PREGNANCY, THIRD TRIMESTER: Primary | ICD-10-CM

## 2024-11-15 PROCEDURE — PNV: Performed by: OBSTETRICS & GYNECOLOGY

## 2024-11-15 NOTE — PROGRESS NOTES
29 y.o.  female at 37w3d (Estimated Date of Delivery: 12/3/24) for PNV.      TW.4 kg (27 lb 6.4 oz)    Leakage of fluid: no  Vaginal bleeding: no  Contractions/Cramping: yes  Fetal movement: yes    RTO in 2 weeks.    The patient has swelling in hands and feet.     No nausea, vomiting, headache or dizziness.     SVE cl/50/-2  Getting uncomfortable - discussed 39 week IOL - desires 39 wk IOL  Reviewed labor precautions and FKCs  F/u 1 week

## 2024-11-22 ENCOUNTER — ROUTINE PRENATAL (OUTPATIENT)
Dept: OBGYN CLINIC | Facility: CLINIC | Age: 29
End: 2024-11-22

## 2024-11-22 VITALS — BODY MASS INDEX: 34.02 KG/M2 | DIASTOLIC BLOOD PRESSURE: 72 MMHG | WEIGHT: 186 LBS | SYSTOLIC BLOOD PRESSURE: 114 MMHG

## 2024-11-22 DIAGNOSIS — Z3A.38 38 WEEKS GESTATION OF PREGNANCY: ICD-10-CM

## 2024-11-22 DIAGNOSIS — Z34.03 ENCOUNTER FOR SUPERVISION OF NORMAL FIRST PREGNANCY, THIRD TRIMESTER: Primary | ICD-10-CM

## 2024-11-22 PROCEDURE — PNV: Performed by: OBSTETRICS & GYNECOLOGY

## 2024-11-22 NOTE — PATIENT INSTRUCTIONS
Am I in labour?    As you enter the final stages of your pregnancy, your body will give signs that labour is approaching. The following information should help you to understand these signs and make it easier for you to determine whether you are in labour.    Some of the signs and symptoms of going into labour may include:    period-like cramps  backache  diarrhoea  mucous discharge or ‘show’  gush or trickle of water as the membranes break  contractions  Engagement  As you move closer to delivery, your baby’s head may drop and become engaged in your pelvis in preparation for labour. If you are expecting your first baby, you may notice pressure in your groin and on your bladder beginning up to four weeks before the birth. You may also notice that you can breathe a little easier and have a little more appetite as your baby drops, and is not pushed up against your diaphragm and stomach quite so much. This is sometimes known as “lightening”, as women generally feel lighter.    Show    During your pregnancy a mucous plug fills your cervix. Towards the end of pregnancy, the cervix becomes softer and this mucous plug may become loosened and start to come away. The process of discharging this mucous is called a ‘show’ and might often contain streaks of blood or may also be brownish in colour. This is different from any flow of fresh blood which you would report immediately to your doctor or the hospital. The show may continue over a period of hours or even days. It is one of the signs that your body is preparing for birth. Labour may begin in the next few days, hours or weeks following a show. There is no need to phone the hospital if you have had a show.    Water breaking (rupture of membranes)    This may occur at any time prior to the start of labour, or at any time during labour. The break may be low, near the opening of the uterus, and will produce a gush of amniotic fluid. If this occurs, place a sanitary pad on and  "note the colour of the fluid. Ring the hospital and tell the midwife that this has occurred. You will usually be asked to come in to the hospital.    Another type of amniotic fluid leak may occur higher up in the amniotic sack, or top of the uterus. This will be less obvious to you and you may only notice a trickle of fluid. Since many women have a heavy vaginal discharge or leak a small amount of urine towards the end of their pregnancy and it is often difficult to tell the difference between urine and amniotic fluid - urine is often yellow; where amniotic fluid is usually clear, or has a pink tinge, and has a \"sweet\" odour. If you are unsure, ring the hospital.    If the colour of the fluid is green or brownish, it indicates that your baby has passed a bowel motion (meconium) inside the uterus. It is very common to have meconium-stained amniotic fluid in a pregnancy over 41 weeks, but this may also be a sign that your baby is distressed. You will need to ring the hospital immediately and then come into the hospital.    Contractions    [place] Ronquillo contractions  [place] Ronquillo contractions are sometimes mistaken for labour. These “practice” contractions usually start detention through the pregnancy and continue right through to the end. These contractions are often irregular and can be uncomfortable and tight. Yg Ronquillo contractions usually increase in regularity and strength towards the end of your pregnancy, preparing your uterus for the birth. Sometimes it is difficult to distinguish between these Yg Ronquillo contractions and labour contractions. Below are the common differences between the two.    Labour contractions  True labour contractions usually increase in strength and duration. In order to time your contractions, time the interval between the start of one contraction to the start of the next. Early labour contractions are often likened to period cramps with or without backache.    Yg Ronquillo " contractions    Labour contractions    usually irregular and short    become regular with time    do not get closer together    get closer together with time    do not get stronger     become stronger    walking does not make them stronger  walking can make them stronger    lying down may make them go away   lying down does not make them go away    uncomfortable and tight--not painful   Painful - can't walk, talk or breathe through them        How does labour start?    Labour can start in different ways. You may be start experiencing some period like pains or contractions. You might notice that these tightenings/contractions start to get stronger, closer and last longer than before. Or you might start with some back ache or a stomach upset that gets stronger and develops into regular contractions. In approximately 10-15% of women, labour will start when your membranes rupture. Contractions usually follow.    Should I call my doctor?    You should call your doctor at [phone] when:    - your patel break  - you have bright blood loss  - your contractions are regular and five minutes apart  - if you have decreased fetal movement

## 2024-11-22 NOTE — PROGRESS NOTES
29 y.o.  female at 38w3d (Estimated Date of Delivery: 12/3/24) for PNV.    Pre-Naomi Vitals      Flowsheet Row Most Recent Value   Prenatal Assessment    Fetal Heart Rate 136   Movement Present   Prenatal Vitals    Blood Pressure 114/72   Weight - Scale 84.4 kg (186 lb)   Urine Albumin/Glucose    Dilation/Effacement/Station    Cervical Dilation .5   Cervical Effacement 50   Fetal Station -2  [posterior, soft.]   Vaginal Drainage    Edema           TW.6 kg (30 lb)    Leakage of fluid: no  Vaginal bleeding: no  Contractions/Cramping: RANDOM CRAMPING  Fetal movement: yes    Discussed IOL. Interested in IOL around her EDC. Reviewed s/s of labor.     RTO in 1 weeks.

## 2024-11-25 ENCOUNTER — TELEPHONE (OUTPATIENT)
Age: 29
End: 2024-11-25

## 2024-11-25 NOTE — TELEPHONE ENCOUNTER
Patient had discussed at her ob visit on 11/22/24 about getting induced 12/3/24 patient is asking if she could be induced 12/2/24 instead. Please advise patient can be reached at [phone].

## 2024-11-26 ENCOUNTER — PATIENT MESSAGE (OUTPATIENT)
Dept: OBGYN CLINIC | Facility: CLINIC | Age: 29
End: 2024-11-26

## 2024-11-27 ENCOUNTER — ROUTINE PRENATAL (OUTPATIENT)
Dept: OBGYN CLINIC | Facility: CLINIC | Age: 29
End: 2024-11-27

## 2024-11-27 VITALS — SYSTOLIC BLOOD PRESSURE: 120 MMHG | DIASTOLIC BLOOD PRESSURE: 80 MMHG | WEIGHT: 190 LBS | BODY MASS INDEX: 34.75 KG/M2

## 2024-11-27 DIAGNOSIS — Z34.03 ENCOUNTER FOR SUPERVISION OF NORMAL FIRST PREGNANCY, THIRD TRIMESTER: Primary | ICD-10-CM

## 2024-11-27 DIAGNOSIS — Z3A.39 39 WEEKS GESTATION OF PREGNANCY: ICD-10-CM

## 2024-11-27 PROCEDURE — PNV: Performed by: PHYSICIAN ASSISTANT

## 2024-11-27 NOTE — PROGRESS NOTES
Patient is a 28 YO  female presenting to the office at 39.1 weeks for routine OB care.   BP: 120/80  TWlb  Fetal Movement: yes good movement   LOF: no  VB: no  CTX: no  SVE FT/50/-2, soft, posterior  Reviewed labor precautions  Call for concerns  IOL 12/3 @ 1500

## 2024-11-27 NOTE — PROGRESS NOTES
29 y.o.  female at 39w1d (Estimated Date of Delivery: 12/3/24) for PNV.      TW.6 kg (30 lb)    Leakage of fluid: no  Vaginal bleeding: no  Contractions/Cramping: yes, the patient has Yg Ronquillo and pelvic pressure.   Fetal movement: yes    The patient has swelling in hands and feet. There is some pitting.     No nausea, vomiting, headache or dizziness.     RTO in 1 weeks.      Urine dip- protein neg, glucose neg.

## 2024-12-02 ENCOUNTER — NURSE TRIAGE (OUTPATIENT)
Age: 29
End: 2024-12-02

## 2024-12-02 ENCOUNTER — HOSPITAL ENCOUNTER (INPATIENT)
Facility: HOSPITAL | Age: 29
LOS: 2 days | Discharge: HOME | End: 2024-12-04
Attending: OBSTETRICS & GYNECOLOGY | Admitting: OBSTETRICS & GYNECOLOGY
Payer: COMMERCIAL

## 2024-12-02 ENCOUNTER — ANESTHESIA (INPATIENT)
Dept: ANESTHESIOLOGY | Facility: HOSPITAL | Age: 29
End: 2024-12-02
Payer: COMMERCIAL

## 2024-12-02 ENCOUNTER — ANESTHESIA EVENT (INPATIENT)
Dept: ANESTHESIOLOGY | Facility: HOSPITAL | Age: 29
End: 2024-12-02
Payer: COMMERCIAL

## 2024-12-02 PROBLEM — Z3A.39 39 WEEKS GESTATION OF PREGNANCY: Status: ACTIVE | Noted: 2024-05-21

## 2024-12-02 LAB
ABO GROUP BLD: NORMAL
BLD GP AB SCN SERPL QL: NEGATIVE
ERYTHROCYTE [DISTWIDTH] IN BLOOD BY AUTOMATED COUNT: 13.7 % (ref 11.6–15.1)
HCT VFR BLD AUTO: 36.5 % (ref 36.5–46.1)
HGB BLD-MCNC: 12.8 G/DL (ref 12–15.4)
HOLD SPECIMEN: NORMAL
MCH RBC QN AUTO: 31.1 PG (ref 26.8–34.3)
MCHC RBC AUTO-ENTMCNC: 35.1 G/DL (ref 31.4–37.4)
MCV RBC AUTO: 89 FL (ref 82–98)
PLATELET # BLD AUTO: 278 THOUSANDS/UL (ref 149–390)
PMV BLD AUTO: 9.9 FL (ref 8.9–12.7)
RBC # BLD AUTO: 4.12 MILLION/UL (ref 3.88–5.12)
RH BLD: POSITIVE
SPECIMEN EXPIRATION DATE: NORMAL
WBC # BLD AUTO: 12.59 THOUSAND/UL (ref 4.31–10.16)

## 2024-12-02 PROCEDURE — 86850 RBC ANTIBODY SCREEN: CPT

## 2024-12-02 PROCEDURE — 86901 BLOOD TYPING SEROLOGIC RH(D): CPT

## 2024-12-02 PROCEDURE — 99213 OFFICE O/P EST LOW 20 MIN: CPT

## 2024-12-02 PROCEDURE — 86780 TREPONEMA PALLIDUM: CPT

## 2024-12-02 PROCEDURE — 86900 BLOOD TYPING SEROLOGIC ABO: CPT

## 2024-12-02 PROCEDURE — 85027 COMPLETE CBC AUTOMATED: CPT

## 2024-12-02 PROCEDURE — G0463 HOSPITAL OUTPT CLINIC VISIT: HCPCS

## 2024-12-02 PROCEDURE — NC001 PR NO CHARGE: Performed by: OBSTETRICS & GYNECOLOGY

## 2024-12-02 RX ORDER — OXYTOCIN/RINGER'S LACTATE 30/500 ML
1-30 PLASTIC BAG, INJECTION (ML) INTRAVENOUS
Status: DISCONTINUED | OUTPATIENT
Start: 2024-12-02 | End: 2024-12-03

## 2024-12-02 RX ORDER — SODIUM CHLORIDE, SODIUM LACTATE, POTASSIUM CHLORIDE, CALCIUM CHLORIDE 600; 310; 30; 20 MG/100ML; MG/100ML; MG/100ML; MG/100ML
125 INJECTION, SOLUTION INTRAVENOUS CONTINUOUS
Status: DISCONTINUED | OUTPATIENT
Start: 2024-12-02 | End: 2024-12-03

## 2024-12-02 RX ORDER — BUPIVACAINE HYDROCHLORIDE 2.5 MG/ML
30 INJECTION, SOLUTION EPIDURAL; INFILTRATION; INTRACAUDAL ONCE AS NEEDED
Status: DISCONTINUED | OUTPATIENT
Start: 2024-12-02 | End: 2024-12-03

## 2024-12-02 RX ADMIN — Medication 25 MCG: at 15:10

## 2024-12-02 RX ADMIN — LIDOCAINE HYDROCHLORIDE AND EPINEPHRINE 3 ML: 15; 5 INJECTION, SOLUTION EPIDURAL at 23:54

## 2024-12-02 RX ADMIN — OXYTOCIN 2 MILLI-UNITS/MIN: 10 INJECTION INTRAVENOUS at 21:38

## 2024-12-02 RX ADMIN — SODIUM CHLORIDE, SODIUM LACTATE, POTASSIUM CHLORIDE, AND CALCIUM CHLORIDE 925 ML/HR: .6; .31; .03; .02 INJECTION, SOLUTION INTRAVENOUS at 23:34

## 2024-12-02 RX ADMIN — SODIUM CHLORIDE, SODIUM LACTATE, POTASSIUM CHLORIDE, AND CALCIUM CHLORIDE 125 ML/HR: .6; .31; .03; .02 INJECTION, SOLUTION INTRAVENOUS at 14:59

## 2024-12-02 NOTE — H&P
H & P- Obstetrics   Cale Stacy 29 y.o. adult MRN: [id number]  Unit/Bed#: LD TRIAGE 4 Encounter: [id number]    Assessment: 29 y.o.  at 39w6d admitted for elective induction of labor.  SVE: 0.5/50/-3  FHT: Cat 1  Clinical EFW: 55% at 20w1d ; Cephalic confirmed by TAUS  GBS status: neg   Postpartum contraception plan: Nuva Ring    Plan:   * 39 weeks gestation of pregnancy  Assessment & Plan  Admit to OBGYN   Clear liquid diet   F/u T&S, CBC, RPR   IVF LR 125cc/hr   Continuous fetal monitoring and tocometry   Analgesia at maternal request   Vertex by TAUS  Induction plan saenz, cytotec, pitocin, arom            Discussed case and plan w/ Dr. Elliott      Chief Complaint: contractions    HPI: Cale Stacy is a 29 y.o.  with an CHEKO of 12/3/2024, by Last Menstrual Period at 39w6d who is being admitted for induction of labor. She complains of uterine contractions, occurring every 7-10 minutes, has no LOF, and reports no VB. She states she has felt good FM.    Patient Active Problem List   Diagnosis    Tinea pedis of both feet    Left foot pain    Plantar warts    39 weeks gestation of pregnancy    Encounter for supervision of normal first pregnancy, third trimester    Obesity affecting pregnancy in third trimester       Baby complications/comments: none    Review of Systems   Constitutional:  Negative for chills and fever.   HENT:  Negative for ear pain and sore throat.    Eyes:  Negative for pain and visual disturbance.   Respiratory:  Negative for cough and shortness of breath.    Cardiovascular:  Negative for chest pain and palpitations.   Gastrointestinal:  Negative for abdominal pain and vomiting.   Genitourinary:  Negative for dysuria and hematuria.   Musculoskeletal:  Negative for arthralgias and back pain.   Skin:  Negative for color change and rash.   Neurological:  Negative for seizures and syncope.   All other systems reviewed and are negative.      OB Hx:  OB History    Para Term   AB Living   1 0 0 0 0 0   SAB IAB Ectopic Multiple Live Births   0 0 0 0 0      # Outcome Date GA Lbr Dominic/2nd Weight Sex Type Anes PTL Lv   1 Current               Obstetric Comments   Menarche 12       Past Medical Hx:  Past Medical History:   Diagnosis Date    Allergic rhinitis     Scoliosis     Varicella     vaccinated       Past Surgical hx:  Past Surgical History:   Procedure Laterality Date    COLONOSCOPY      NOSE SURGERY               Allergies   Allergen Reactions    Augmentin [Amoxicillin-Pot Clavulanate] Diarrhea         Medications Prior to Admission:     Prenatal Vit-Fe Fumarate-FA (PRENATAL PO)    Objective:  Temp:  [98.5 °F (36.9 °C)] 98.5 °F (36.9 °C)  HR:  [] 97  BP: (101-140)/(61-83) 129/66  Resp:  [18] 18  Body mass index is 34.75 kg/m².     Physical Exam:  Physical Exam  Constitutional:       Appearance: Normal appearance.   HENT:      Head: Atraumatic.   Eyes:      Extraocular Movements: Extraocular movements intact.      Conjunctiva/sclera: Conjunctivae normal.   Cardiovascular:      Rate and Rhythm: Normal rate and regular rhythm.      Pulses: Normal pulses.   Pulmonary:      Effort: Pulmonary effort is normal. No respiratory distress.      Breath sounds: Normal breath sounds.   Abdominal:      Palpations: Abdomen is soft.      Tenderness: There is no abdominal tenderness.   Neurological:      General: No focal deficit present.      Mental Status: She is alert and oriented to person, place, and time.   Skin:     General: Skin is warm and dry.   Psychiatric:         Mood and Affect: Mood normal.         Behavior: Behavior normal.   Vitals reviewed. Exam conducted with a chaperone present.            FHT:  Baseline Rate (FHR): 135 bpm  Variability: Moderate  Accelerations: 15 x 15 or greater, At variable times  Decelerations: None    TOCO:   Contraction Frequency (minutes): 6-9  Contraction Duration (seconds): 60-70  Contraction Intensity: Mild    Lab Results   Component  Value Date    WBC 12.25 (H) 08/17/2024    HGB 11.5 (L) 08/17/2024    HCT 33.3 (L) 08/17/2024     08/17/2024     Lab Results   Component Value Date    K 3.6 07/11/2023     07/11/2023    CO2 23 07/11/2023    BUN 13 07/11/2023    CREATININE 0.84 07/11/2023    AST 19 07/11/2023    ALT 12 07/11/2023     Prenatal Labs: Reviewed      Blood type: O pos  Antibody: neg  GBS: neg  HIV: neg  Rubella: neg  Syphilis IgM/IgG: neg  HBsAg: neg  HCAb: neg  Chlamydia: neg  Gonorrhea: neg  Diabetes 1 hour screen: 110  3 hour glucose: N/A  Platelets: 338  Hgb: 13.7  >2 Midnights  INPATIENT     Signature/Title: Lalitha Zapien MD  Date: 12/2/2024  Time: 2:01 PM

## 2024-12-02 NOTE — ASSESSMENT & PLAN NOTE
Continue routine post partum care  Pain well controlled: tylenol/motrin scheduled  Lochia within normal limits: continue to monitor   OOB: as able, encourage ambulation  Passing flatus  Voiding spontaneously

## 2024-12-02 NOTE — OB LABOR/OXYTOCIN SAFETY PROGRESS
Labor Progress Note - Cale Stacy 29 y.o. adult MRN: [id number]    Unit/Bed#: -01 Encounter: [id number]       Contraction Frequency (minutes): 6-9  Contraction Intensity: Mild  Uterine Activity Characteristics: Irregular                 Baseline Rate (FHR): 135 bpm  Fetal Heart Rate (FHT): 127 BPM  FHR Category: 1    Vital Signs:   Vitals:    12/02/24 1405   BP: 126/76   Pulse: 70   Resp: 16   Temp:      PROCEDURE:  SAENZ BALLOON PLACEMENT    A 24F saenz with a 30cc balloon was selected, SVE was performed and cervix was located, saenz was introduced over sterile gloved hands. Balloon advanced through cervix beyond the internal cervical os. A small amount amount of sterile saline solution was instilled in the balloon to confirm placement. Placement was confirmed to be beyond the internal cervical os. A total of 60cc of sterile saline solution was placed into the balloon. Pt tolerated well. Vaginal cytotec placed.     Notify MD when saenz dislodged.    Dr. Lexi Zapien MD 12/2/2024 3:13 PM

## 2024-12-02 NOTE — TELEPHONE ENCOUNTER
39w6d  OB patient calling in to report leakage of fluid that began yesterday. States she noticed small amount of fluid leaking throughout the day and was unsure if urine. Patient states she woke up at 2:30 am this morning with a bunch of liquid in her underwear that had then trickled down her leg when she stood up. Patient states she put a pad on and went back to bed. At 6:30 this morning, she noticed red/pink blood in the pad that continues with wiping. Reports contractions that are not regular, consistent  lower back pain. Also reports pelvic pressure. Endorses good fetal movement.     Advised patient leave now to [facility] L&D. Patient verbalized understanding.  ETA: 35-40min. ESC sent to on-call provider and charge RN as genny.  Reason for Disposition   Leakage of fluid from vagina   Vaginal bleeding    Protocols used: Pregnancy - Rupture of Membranes-ADULT-[place]

## 2024-12-03 LAB
BASE EXCESS BLDCOA CALC-SCNC: -9.9 MMOL/L (ref 3–11)
BASE EXCESS BLDCOV CALC-SCNC: -8.3 MMOL/L (ref 1–9)
HCO3 BLDCOA-SCNC: 19.1 MMOL/L (ref 17.3–27.3)
HCO3 BLDCOV-SCNC: 18 MMOL/L (ref 12.2–28.6)
O2 CT VFR BLDCOA CALC: 9 ML/DL
OXYHGB MFR BLDCOA: 40.4 %
OXYHGB MFR BLDCOV: 47.2 %
PCO2 BLDCOA: 53.7 MM[HG] (ref 30–60)
PCO2 BLDCOV: 40.3 MM HG (ref 27–43)
PH BLDCOA: 7.17 [PH] (ref 7.23–7.43)
PH BLDCOV: 7.27 [PH] (ref 7.19–7.49)
PO2 BLDCOA: 23.3 MM HG (ref 5–25)
PO2 BLDCOV: 23 MM HG (ref 15–45)
SAO2 % BLDCOV: 10.4 ML/DL
TREPONEMA PALLIDUM IGG+IGM AB [PRESENCE] IN SERUM OR PLASMA BY IMMUNOASSAY: NORMAL

## 2024-12-03 PROCEDURE — 82805 BLOOD GASES W/O2 SATURATION: CPT | Performed by: OBSTETRICS & GYNECOLOGY

## 2024-12-03 PROCEDURE — 59400 OBSTETRICAL CARE: CPT | Performed by: OBSTETRICS & GYNECOLOGY

## 2024-12-03 PROCEDURE — 0KQM0ZZ REPAIR PERINEUM MUSCLE, OPEN APPROACH: ICD-10-PCS | Performed by: OBSTETRICS & GYNECOLOGY

## 2024-12-03 PROCEDURE — 4A1HXCZ MONITORING OF PRODUCTS OF CONCEPTION, CARDIAC RATE, EXTERNAL APPROACH: ICD-10-PCS | Performed by: OBSTETRICS & GYNECOLOGY

## 2024-12-03 PROCEDURE — 3E033VJ INTRODUCTION OF OTHER HORMONE INTO PERIPHERAL VEIN, PERCUTANEOUS APPROACH: ICD-10-PCS | Performed by: OBSTETRICS & GYNECOLOGY

## 2024-12-03 PROCEDURE — 10907ZC DRAINAGE OF AMNIOTIC FLUID, THERAPEUTIC FROM PRODUCTS OF CONCEPTION, VIA NATURAL OR ARTIFICIAL OPENING: ICD-10-PCS | Performed by: OBSTETRICS & GYNECOLOGY

## 2024-12-03 RX ORDER — CALCIUM CARBONATE 500 MG/1
1000 TABLET, CHEWABLE ORAL DAILY PRN
Status: DISCONTINUED | OUTPATIENT
Start: 2024-12-03 | End: 2024-12-04 | Stop reason: HOSPADM

## 2024-12-03 RX ORDER — DOCUSATE SODIUM 100 MG/1
100 CAPSULE, LIQUID FILLED ORAL 2 TIMES DAILY
Status: DISCONTINUED | OUTPATIENT
Start: 2024-12-03 | End: 2024-12-04 | Stop reason: HOSPADM

## 2024-12-03 RX ORDER — ACETAMINOPHEN 325 MG/1
650 TABLET ORAL EVERY 6 HOURS
Status: DISCONTINUED | OUTPATIENT
Start: 2024-12-03 | End: 2024-12-04 | Stop reason: HOSPADM

## 2024-12-03 RX ORDER — OXYTOCIN/RINGER'S LACTATE 30/500 ML
62.5 PLASTIC BAG, INJECTION (ML) INTRAVENOUS CONTINUOUS
Status: DISPENSED | OUTPATIENT
Start: 2024-12-03 | End: 2024-12-03

## 2024-12-03 RX ORDER — EPHEDRINE SULFATE 50 MG/ML
INJECTION INTRAVENOUS AS NEEDED
Status: DISCONTINUED | OUTPATIENT
Start: 2024-12-03 | End: 2024-12-05 | Stop reason: HOSPADM

## 2024-12-03 RX ORDER — BENZOCAINE/MENTHOL 6 MG-10 MG
1 LOZENGE MUCOUS MEMBRANE DAILY PRN
Status: DISCONTINUED | OUTPATIENT
Start: 2024-12-03 | End: 2024-12-04 | Stop reason: HOSPADM

## 2024-12-03 RX ORDER — BUTORPHANOL TARTRATE 1 MG/ML
1 INJECTION, SOLUTION INTRAMUSCULAR; INTRAVENOUS ONCE
Status: DISCONTINUED | OUTPATIENT
Start: 2024-12-03 | End: 2024-12-03

## 2024-12-03 RX ORDER — ONDANSETRON 2 MG/ML
4 INJECTION INTRAMUSCULAR; INTRAVENOUS EVERY 8 HOURS PRN
Status: DISCONTINUED | OUTPATIENT
Start: 2024-12-03 | End: 2024-12-04 | Stop reason: HOSPADM

## 2024-12-03 RX ORDER — SIMETHICONE 80 MG
80 TABLET,CHEWABLE ORAL 4 TIMES DAILY PRN
Status: DISCONTINUED | OUTPATIENT
Start: 2024-12-03 | End: 2024-12-04 | Stop reason: HOSPADM

## 2024-12-03 RX ORDER — OXYTOCIN/RINGER'S LACTATE 30/500 ML
250 PLASTIC BAG, INJECTION (ML) INTRAVENOUS ONCE
Status: COMPLETED | OUTPATIENT
Start: 2024-12-03 | End: 2024-12-03

## 2024-12-03 RX ORDER — LIDOCAINE HYDROCHLORIDE AND EPINEPHRINE 15; 5 MG/ML; UG/ML
INJECTION, SOLUTION EPIDURAL
Status: COMPLETED | OUTPATIENT
Start: 2024-12-02 | End: 2024-12-02

## 2024-12-03 RX ORDER — IBUPROFEN 600 MG/1
600 TABLET, FILM COATED ORAL EVERY 6 HOURS
Status: DISCONTINUED | OUTPATIENT
Start: 2024-12-03 | End: 2024-12-04 | Stop reason: HOSPADM

## 2024-12-03 RX ORDER — ONDANSETRON 2 MG/ML
4 INJECTION INTRAMUSCULAR; INTRAVENOUS EVERY 4 HOURS PRN
Status: DISCONTINUED | OUTPATIENT
Start: 2024-12-03 | End: 2024-12-03

## 2024-12-03 RX ADMIN — SODIUM CHLORIDE, SODIUM LACTATE, POTASSIUM CHLORIDE, AND CALCIUM CHLORIDE 125 ML/HR: .6; .31; .03; .02 INJECTION, SOLUTION INTRAVENOUS at 03:00

## 2024-12-03 RX ADMIN — IBUPROFEN 600 MG: 600 TABLET ORAL at 17:53

## 2024-12-03 RX ADMIN — ROPIVACAINE HYDROCHLORIDE: 2 INJECTION, SOLUTION EPIDURAL; INFILTRATION at 00:00

## 2024-12-03 RX ADMIN — ONDANSETRON 4 MG: 2 INJECTION INTRAMUSCULAR; INTRAVENOUS at 04:45

## 2024-12-03 RX ADMIN — WITCH HAZEL 1 PAD: 500 SOLUTION RECTAL; TOPICAL at 11:16

## 2024-12-03 RX ADMIN — DOCUSATE SODIUM 100 MG: 100 CAPSULE, LIQUID FILLED ORAL at 18:09

## 2024-12-03 RX ADMIN — EPHEDRINE SULFATE 10 MG: 50 INJECTION INTRAVENOUS at 01:10

## 2024-12-03 RX ADMIN — ACETAMINOPHEN 650 MG: 325 TABLET, FILM COATED ORAL at 10:47

## 2024-12-03 RX ADMIN — HYDROCORTISONE 1 APPLICATION: 1 CREAM TOPICAL at 11:16

## 2024-12-03 RX ADMIN — EPHEDRINE SULFATE 50 MG: 50 INJECTION INTRAVENOUS at 01:10

## 2024-12-03 RX ADMIN — BENZOCAINE AND LEVOMENTHOL 1 APPLICATION: 200; 5 SPRAY TOPICAL at 11:16

## 2024-12-03 RX ADMIN — IBUPROFEN 600 MG: 600 TABLET ORAL at 23:36

## 2024-12-03 RX ADMIN — ROPIVACAINE HYDROCHLORIDE: 2 INJECTION, SOLUTION EPIDURAL; INFILTRATION at 01:16

## 2024-12-03 RX ADMIN — SODIUM CHLORIDE, SODIUM LACTATE, POTASSIUM CHLORIDE, AND CALCIUM CHLORIDE 125 ML/HR: .6; .31; .03; .02 INJECTION, SOLUTION INTRAVENOUS at 00:43

## 2024-12-03 RX ADMIN — OXYTOCIN 250 MILLI-UNITS/MIN: 10 INJECTION INTRAVENOUS at 09:15

## 2024-12-03 RX ADMIN — OXYTOCIN 62.5 MILLI-UNITS/MIN: 10 INJECTION INTRAVENOUS at 11:07

## 2024-12-03 NOTE — OB LABOR/OXYTOCIN SAFETY PROGRESS
Labor Progress Note - Cale Stacy 29 y.o. adult MRN: [id number]    Unit/Bed#: -01 Encounter: [id number]       Contraction Frequency (minutes): 2-3  Contraction Intensity: Mild  Uterine Activity Characteristics: Irregular  Cervical Dilation: 1-2        Cervical Effacement: 60  Fetal Station: -3  Baseline Rate (FHR): 130 bpm  Fetal Heart Rate (FHT): 130 BPM  FHR Category: 1               Vital Signs:   Vitals:    12/02/24 1615   BP: 120/65   Pulse:    Resp:    Temp:    SpO2:        Notes/comments:   Patient feeling discomfort 2/2 balloon but denies pain meds at this time.  FHT category 1 with contractions every 2-3 min.  SVE is 1.5/60/-3 with saenz balloon in place and on tension.  Will start Pitocin given contractions are too frequent for additional cytotec.  Dr. Elliott aware.    Shaina Gonzalez MD 12/2/2024 7:39 PM

## 2024-12-03 NOTE — ANESTHESIA PREPROCEDURE EVALUATION
Procedure:  LABOR ANALGESIA    Relevant Problems   GYN   (+) 39 weeks gestation of pregnancy   (+) Encounter for supervision of normal first pregnancy, third trimester        Physical Exam    Airway    Mallampati score: II  TM Distance: >3 FB  Neck ROM: full     Dental       Cardiovascular  Cardiovascular exam normal    Pulmonary  Pulmonary exam normal     Other Findings  post-pubertal.      Anesthesia Plan  ASA Score- 2     Anesthesia Type- epidural with ASA Monitors.         Additional Monitors:     Airway Plan:            Plan Factors-Exercise tolerance (METS): >4 METS.    Chart reviewed. EKG reviewed. Imaging results reviewed. Existing labs reviewed. Patient summary reviewed.                  Induction- intravenous.    Postoperative Plan-     Perioperative Resuscitation Plan - Level 1 - Full Code.       Informed Consent- Anesthetic plan and risks discussed with patient.  I personally reviewed this patient with the CRNA. Discussed and agreed on the Anesthesia Plan with the CRNA..

## 2024-12-03 NOTE — OB LABOR/OXYTOCIN SAFETY PROGRESS
Oxytocin Safety Progress Check Note - Cale Stacy 29 y.o. adult MRN: [id number]    Unit/Bed#: -01 Encounter: [id number]    Dose (simon-units/min) Oxytocin: 2 simon-units/min  Contraction Frequency (minutes):  (difficult to trace)  Contraction Intensity: Moderate/Strong  Uterine Activity Characteristics: Irregular  Cervical Dilation: 9        Cervical Effacement: 90  Fetal Station: 1  Baseline Rate (FHR): 155 bpm  Fetal Heart Rate (FHT): 160 BPM  FHR Category: 1               Vital Signs:   Vitals:    12/03/24 0400   BP: 122/72   Pulse: (!) 114   Resp:    Temp:    SpO2:        Notes/comments:   Cervix mainly anterior. Will do maternal repositioning to encourage complete dilation.   Fetal descent noted.   Recheck in 1 hour.       Fariba Elliott MD 12/3/2024 4:17 AM

## 2024-12-03 NOTE — OB LABOR/OXYTOCIN SAFETY PROGRESS
Oxytocin Safety Progress Check Note - Cale Stacy 29 y.o. adult MRN: [id number]    Unit/Bed#: -01 Encounter: [id number]    Dose (simon-units/min) Oxytocin: 2 simon-units/min  Contraction Frequency (minutes): 2-3  Contraction Intensity: Mild/Moderate  Uterine Activity Characteristics: Irregular  Cervical Dilation: 4        Cervical Effacement: 80  Fetal Station: -2  Baseline Rate (FHR): 145 bpm  Fetal Heart Rate (FHT): 138 BPM  FHR Category: I             Vital Signs:   Vitals:    24 2315   BP: 118/82   Pulse:    Resp:    Temp:    SpO2:      Notes/comments:   Presented to bedside for nursing reports of SROM for pink-tinged fluid at 2255. Pt increasingly uncomfortable with contractions, requests epidural at this time. FHR Category I. [place] with contractions q2-3min. SVE as above. Pit running at 2, continue to titrate to contractions. Will recheck in 2 hours or sooner if clinically indicated. Anticipate . Attending physician Dr. Lexi carranza.     Elaine Miranda MD 2024 11:18 PM

## 2024-12-03 NOTE — OB LABOR/OXYTOCIN SAFETY PROGRESS
Labor Progress Note - Cale Stacy 29 y.o. adult MRN: [id number]    Unit/Bed#: -01 Encounter: [id number]       Contraction Frequency (minutes): 2-3  Contraction Intensity: Mild  Uterine Activity Characteristics: Irregular  Cervical Dilation: 3-4        Cervical Effacement: 60  Fetal Station: -3  Baseline Rate (FHR): 130 bpm  Fetal Heart Rate (FHT): 152 BPM  FHR Category: 1               Vital Signs:   Vitals:    12/02/24 2021   BP: 126/77   Pulse: 93   Resp: 20   Temp: 98.2 °F (36.8 °C)   SpO2: 99%       Notes/comments:   [place] balloon dislodged.  FHT category 1 with contractions every 2-3 min.  SVE 3.5/60/-3.  Will start Pitocin now as planned per prior note.  Dr. Elliott aware.      Shaina Gonzalez MD 12/2/2024 8:48 PM

## 2024-12-03 NOTE — OB LABOR/OXYTOCIN SAFETY PROGRESS
Oxytocin Safety Progress Check Note - Cale Stacy 29 y.o. adult MRN: [id number]    Unit/Bed#: -01 Encounter: [id number]    Dose (simon-units/min) Oxytocin: 2 simon-units/min  Contraction Frequency (minutes): 3-4  Contraction Intensity: Moderate/Strong  Uterine Activity Characteristics: Regular  Cervical Dilation: 8-9        Cervical Effacement: 90  Fetal Station: 0  Baseline Rate (FHR): 140 bpm  Fetal Heart Rate (FHT): 120 BPM  FHR Category: I             Vital Signs:   Vitals:    24 0200   BP: 127/65   Pulse: 82   Resp:    Temp:    SpO2:      Notes/comments:   Pt reporting increased pressure. FHR Category I. [place] with contractions q3min. SVE as above. Pit running at 2, continue to titrate to contractions. Will recheck in 2 hours or sooner if clinically indicated. Anticipate . Attending physician Dr. Lexi Miranda MD 12/3/2024 2:49 AM

## 2024-12-03 NOTE — ANESTHESIA POSTPROCEDURE EVALUATION
Post-Op Assessment Note    CV Status:  Stable  Pain Score: 0    Pain management: adequate      Post-op block assessment: catheter intact, site cleaned and no complications   Mental Status:  Alert and awake   Hydration Status:  Euvolemic   PONV Controlled:  Controlled   Airway Patency:  Patent     Post Op Vitals Reviewed: Yes    No anethesia notable event occurred.    Staff: CRNA           Last Filed PACU Vitals:  Vitals Value Taken Time   Temp     Pulse 93 12/03/24 1212   /55 12/03/24 1212   Resp     SpO2     Vitals shown include unfiled device data.    Modified [place]:  No data recorded

## 2024-12-03 NOTE — OB LABOR/OXYTOCIN SAFETY PROGRESS
Oxytocin Safety Progress Check Note - Cale Stacy 29 y.o. adult MRN: [id number]    Unit/Bed#: -01 Encounter: [id number]    Dose (simon-units/min) Oxytocin: 2 simon-units/min  Contraction Frequency (minutes): 3-4  Contraction Intensity: Moderate/Strong  Uterine Activity Characteristics: Regular  Cervical Dilation: Lip/rim (Comment)        Cervical Effacement: 100  Fetal Station: 2  Baseline Rate (FHR): 155 bpm  Fetal Heart Rate (FHT): 158 BPM  FHR Category: 1               Vital Signs:   Vitals:    12/03/24 0515   BP: 136/81   Pulse:    Resp:    Temp:    SpO2:        Notes/comments:   Patient feeling increased pressure.  SVE 9.5/100/+2 with remaining cervix entirely anterior and not reducible.  FHT category 1 with contractions every 3-4 min.  Pit running at 2, continue titrating to contractions.  Dr. Lexi carranza.      Shaina Gonzalez MD 12/3/2024 5:46 AM

## 2024-12-03 NOTE — PLAN OF CARE
Problem: Knowledge Deficit  Goal: Verbalizes understanding of labor plan  Description: Assess patient/family/caregiver's baseline knowledge level and ability to understand information.  Provide education via patient/family/caregiver's preferred learning method at appropriate level of understanding.     1. Provide teaching at level of understanding.  2. Provide teaching via preferred learning method(s).  Outcome: Progressing  Goal: Patient/family/caregiver demonstrates understanding of disease process, treatment plan, medications, and discharge instructions  Description: Complete learning assessment and assess knowledge base.  Interventions:  - Provide teaching at level of understanding  - Provide teaching via preferred learning methods  Outcome: Progressing     Problem: Labor & Delivery  Goal: Manages discomfort  Description: Assess and monitor for signs and symptoms of discomfort.  Assess patient's pain level regularly and per hospital policy.  Administer medications as ordered. Support use of nonpharmacological methods to help control pain such as distraction, imagery, relaxation, and application of heat and cold.  Collaborate with interdisciplinary team and patient to determine appropriate pain management plan.    1. Include patient in decisions related to comfort.  2. Offer non-pharmacological pain management interventions.  3. Report ineffective pain management to physician.  Outcome: Progressing  Goal: Patient vital signs are stable  Description: 1. Assess vital signs - vaginal delivery.  Outcome: Progressing     Problem: PAIN - ADULT  Goal: Verbalizes/displays adequate comfort level or baseline comfort level  Description: Interventions:  - Encourage patient to monitor pain and request assistance  - Assess pain using appropriate pain scale  - Administer analgesics based on type and severity of pain and evaluate response  - Implement non-pharmacological measures as appropriate and evaluate response  -  Consider cultural and social influences on pain and pain management  - Notify physician/advanced practitioner if interventions unsuccessful or patient reports new pain  Outcome: Progressing     Problem: INFECTION - ADULT  Goal: Absence or prevention of progression during hospitalization  Description: INTERVENTIONS:  - Assess and monitor for signs and symptoms of infection  - Monitor lab/diagnostic results  - Monitor all insertion sites, i.e. indwelling lines, tubes, and drains  - Monitor endotracheal if appropriate and nasal secretions for changes in amount and color  - [place] appropriate cooling/warming therapies per order  - Administer medications as ordered  - Instruct and encourage patient and family to use good hand hygiene technique  - Identify and instruct in appropriate isolation precautions for identified infection/condition  Outcome: Progressing  Goal: Absence of fever/infection during neutropenic period  Description: INTERVENTIONS:  - Monitor WBC    Outcome: Progressing     Problem: SAFETY ADULT  Goal: Patient will remain free of falls  Description: INTERVENTIONS:  - Educate patient/family on patient safety including physical limitations  - Instruct patient to call for assistance with activity   - Consult OT/PT to assist with strengthening/mobility   - Keep Call bell within reach  - Keep bed low and locked with side rails adjusted as appropriate  - Keep care items and personal belongings within reach  - Initiate and maintain comfort rounds  - Make Fall Risk Sign visible to staff  - Offer Toileting every 2-3 Hours, in advance of need  - Initiate/Maintain alarm  - Obtain necessary fall risk management equipment:   - Apply yellow socks and bracelet for high fall risk patients  - Consider moving patient to room near nurses station  Outcome: Progressing  Goal: Maintain or return to baseline ADL function  Description: INTERVENTIONS:  -  Assess patient's ability to carry out ADLs; assess patient's baseline  for ADL function and identify physical deficits which impact ability to perform ADLs (bathing, care of mouth/teeth, toileting, grooming, dressing, etc.)  - Assess/evaluate cause of self-care deficits   - Assess range of motion  - Assess patient's mobility; develop plan if impaired  - Assess patient's need for assistive devices and provide as appropriate  - Encourage maximum independence but intervene and supervise when necessary  - Involve family in performance of ADLs  - Assess for home care needs following discharge   - Consider OT consult to assist with ADL evaluation and planning for discharge  - Provide patient education as appropriate  Outcome: Progressing  Goal: Maintains/Returns to pre admission functional level  Description: INTERVENTIONS:  - Perform AM-PAC 6 Click Basic Mobility/ Daily Activity assessment daily.  - Set and communicate daily mobility goal to care team and patient/family/caregiver.   - Collaborate with rehabilitation services on mobility goals if consulted- Out of bed for toileting  - Record patient progress and toleration of activity level   Outcome: Progressing     Problem: DISCHARGE PLANNING  Goal: Discharge to home or other facility with appropriate resources  Description: INTERVENTIONS:  - Identify barriers to discharge w/patient and caregiver  - Arrange for needed discharge resources and transportation as appropriate  - Identify discharge learning needs (meds, wound care, etc.)  - Arrange for interpretive services to assist at discharge as needed  - Refer to Case Management Department for coordinating discharge planning if the patient needs post-hospital services based on physician/advanced practitioner order or complex needs related to functional status, cognitive ability, or social support system  Outcome: Progressing

## 2024-12-03 NOTE — ANESTHESIA PROCEDURE NOTES
Epidural Block    Patient location during procedure: OB/L&D  Start time: 12/2/2024 11:52 PM  Reason for block: procedure for pain  Staffing  Performed by: Desiree Felix CRNA  Authorized by: FERNANDO Mathew MD    Preanesthetic Checklist  Completed: patient identified, IV checked, site marked, risks and benefits discussed, surgical consent, monitors and equipment checked, pre-op evaluation and timeout performed  Epidural  Patient position: sitting  Prep: ChloraPrep  Sedation Level: no sedation  Patient monitoring: frequent blood pressure checks, continuous pulse oximetry and heart rate  Approach: midline  Location: lumbar, L3-4  Injection technique: NGHIA saline  Needle  Needle type: Tuohy   Needle gauge: 17 G  Needle insertion depth: 8 cm  Catheter type: multi-orifice  Catheter size: 19 G  Catheter at skin depth: 14 cm  Catheter securement method: stabilization device and clear occlusive dressing  Test dose: negativelidocaine-epinephrine (XYLOCAINE-MPF/EPINEPHRINE) 1.5 %-1:200,000 injection 3 mL - Epidural   3 mL - 12/2/2024 11:54:00 PM  Assessment  Sensory level: T10  Number of attempts: 1negative aspiration for CSF, negative aspiration for heme and no paresthesia on injection  patient tolerated the procedure well with no immediate complications

## 2024-12-03 NOTE — LACTATION NOTE
This note was copied from a baby's chart.  CONSULT - LACTATION  Baby Boy Stacy (Glenn) 0 days male MRN: [id number]    [facility] AN NURSERY Room / Bed: (N)/(N) Encounter: [id number]    Maternal Information     MOTHER:  Cale Stacy  Maternal Age: 29 y.o.  OB History: # 1 - Date: 24, Sex: Male, Weight: 3660 g (8 lb 1.1 oz), GA: 40w0d, Type: Vaginal, Spontaneous, Apgar1: 8, Apgar5: 9, Living: Living, Birth Comments: None   Previouse breast reduction surgery? No    Lactation history:   Has patient previously breast fed: No   How long had patient previously breast fed:     Previous breast feeding complications:       Past Surgical History:   Procedure Laterality Date    COLONOSCOPY      NOSE SURGERY             Birth information:  YOB: 2023   Time of birth: 9:12 AM   Sex: male   Delivery type: Vaginal, Spontaneous   Birth Weight: 3660 g (8 lb 1.1 oz)   Percent of Weight Change: 0%     Gestational Age: 40w0d   [unfilled]    Assessment     Breast and nipple assessment:  triangular breasts with everted nipples     [place] Assessment: sleepy    Feeding assessment: feeding well  LATCH:  Latch: Grasps breast, tongue down, lips flanged, rhythmic sucking   Audible Swallowing: Spontaneous and intermittent (24 hours old)   Type of Nipple: Everted (After stimulation)   Comfort (Breast/Nipple): Soft/non-tender   Hold (Positioning): Partial assist, teach one side, mother does other, staff holds   LATCH Score: 9           24 1530   Lactation Consultation   Reason for Consult 20;20 min   Maternal Information   Has mother  before? No   Infant to breast within first hour of birth? Yes   Exclusive Pump and Bottle Feed No   LATCH Documentation   Latch 2   Audible Swallowing 2   Type of Nipple 2   Comfort (Breast/Nipple) 2   Hold (Positioning) 1   LATCH Score 9   Having latch problems? No   Position(s) Used Football   Breasts/Nipples   Left  Breast Soft  (triangular breast)   Right Breast Soft  (triangular breast)   Left Nipple Everted   Right Nipple Everted   Intervention Hand expression   Breastfeeding Progress Not yet established   Other OB Lactation Tools   Feeding Devices Syringe   Breast Pump   Pump 3  (Spectra S1)   Pump Review/Education Milk storage   Patient Follow-Up   Lactation Consult Status 2   Follow-Up Type Inpatient;Call as needed   Other OB Lactation Documentation    Additional Problem Noted Initial Consult: Mom called out for help in latching baby to breast. Baby very sleepy to latch, Demonstration with teach back of hand expression, effective for 1.9 mLs. Demonstration with teach back of football hold on left breast.     Feeding recommendations:  breast feed on demand    Initial Consult: Mom called out for help in latching baby to breast. Upon arrival into room; baby sleepy, Nurse hand expressing into babies mouth. Baby too sleepy to latch. Demonstration with teach back of hand expression; effective for 1.9 mLs colostrum. Baby started cueing, demonstration with teach back of football hold on left breast. Baby latched deeply and actively sucking with swallows. Encouraged snug hold of baby. Reviewed proper position and alignment for deep latch. Reviewed babies bellies and milk amounts. Enc to put baby to breast when baby cueing. Reviewed how to syringe feed colostrum. Enc mom to call for further assistance.     Information on hand expression given. Discussed benefits of knowing how to manually express breast including stimulating milk supply, softening nipple for latch and evacuating breast in the event of engorgement.    Provided demonstration, education and support of deep latch to breast by placing the nipple to the nose, dragging down to chin to achieve a wide latch. Bring baby to the breast, not breast to baby. Move your shoulders down and away from your ears. Look for ear, shoulder, hip alignment. Baby's upper and lower lip should  be flanged on the breast.    Encouraged parents to call for assistance, questions, and concerns about breastfeeding.  Extension provided.    Linda Rodriguez MA 12/3/2024 4:42 PM

## 2024-12-03 NOTE — DISCHARGE SUMMARY
Discharge Summary - OB/GYN  Cael Stacy 29 y.o. adult MRN: [id number]  Unit/Bed#: -01 Encounter: [id number]    Admission Date: 2024     Discharge Date: 24     Admitting Attending: Lexi    Delivering Attending: Lexi    Discharging Attending: Dr. Monsivais    Principal Diagnosis: Pregnancy at 39 weeks    Secondary Diagnosis:   1. none    Procedures: spontaneous vaginal delivery, s/p laceration repair    Anesthesia: epidural    Hospital course: Cale Stacy is a 29 y.o.  at 39w6d who was initially admitted for elective induction of labor. She was induced with saenz balloon and cytotec. She was then started on pitocin. SROM occurred for clear fluid. She received her epidural for analgesia. She progressed to complete and started pushing.     She delivered a viable male  on 2024 at 0912. Weight 8lbs 1.1oz via normal spontaneous vaginal delivery. She sustained a second degree perineal laceration during delivery which was adequately repaired. Apgars were 8 (1 min) and 9 (5 min).  was transferred to  nursery. Patient tolerated the procedure well.     Her post-delivery course was uncomplicated. Her postpartum pain was well controlled with oral analgesics.    On day of discharge, she was ambulating and able to reasonably perform all ADLs. She was voiding and had appropriate bowel function. Pain was well controlled. She was discharged home on postpartum day #1 without complications. Patient was instructed to follow up with her OB as an outpatient and was given appropriate warnings to call provider if she develops signs of infection or uncontrolled pain.    Complications: none apparent    Condition at discharge: good     Discharge instructions/Information to patient and family:   See after visit summary for information provided to patient and family.      Provisions for Follow-Up Care:  See after visit summary for information related to follow-up care and any pertinent home  health orders.      Disposition: See After Visit Summary for discharge disposition information.    Planned Readmission: No    Discharge medications and instructions:   Please see AVS for full list of medications upon discharge.      Sophie Kahn  PGY-2 OB/GYN  12/04/24  4:30 PM

## 2024-12-03 NOTE — L&D DELIVERY NOTE
Vaginal Delivery Summary - OB/GYN   Cale Stacy 29 y.o. adult MRN: [id number]  Unit/Bed#: -01 Encounter: [id number]          Predelivery Diagnosis:  1. Pregnancy at 40weeks     Postdelivery Diagnosis:  1. Same as above  2. Delivery of male term     Procedure: Spontaneous Vaginal Delivery, repair of 2nd degree laceration    Attending: Dr. Elliott    Assistant: Dr. Rep    Anesthesia: Epidural    QBL: 153cc  Admission H.8  Admission platelets: 278    Complications: none apparent    Specimens: cord blood, arterial and venous cord blood gasses, placenta to storage    Findings:   1. Viable male at 0912, with APGARS of 8 and 9 at 1 and 5 minutes respectively,  2. Spontaneous delivery of intact placenta at 0915  3. 2 degree laceration repaired with 2-0 Vicryk  4. Blood gases:    Umbilical Cord Venous Blood Gas:  Results from last 7 days   Lab Units 24  0915   PH COV  7.269   PCO2 COV mm HG 40.3   HCO3 COV mmol/L 18.0   BASE EXC COV mmol/L -8.3*   O2 CT CD VB mL/dL 10.4   O2 HGB, VENOUS CORD % 47.2     Umbilical Cord Arterial Blood Gas:  Results from last 7 days   Lab Units 24  0915   PH COA  7.169*   PCO2 COA  53.7   PO2 COA mm HG 23.3   HCO3 COA mmol/L 19.1   BASE EXC COA mmol/L -9.9*   O2 CONTENT CORD ART ml/dl 9.0   O2 HGB, ARTERIAL CORD % 40.4       Disposition:  Patient tolerated the procedure well and was recovering in labor and delivery room     Brief history and labor course:  Cale Stacy is a 29 y.o.  at 39w6d who was initially admitted for elective induction of labor. She was induced with saenz baloon and cytotec. Membranes spontaneously ruptured for clear fluid. Pitocin was started. She opted for epidural for pain control. She progressed to completion and began pushing.     Description of procedure    After pushing for 156 minutes, at 0912 patient delivered a viable male , wt pending, apgars of 8 (1 min) and 9 (5 min). The fetal vertex delivered spontaneously.  Baby was checked for nuchal. None were noted. The anterior shoulder delivered atraumatically with maternal expulsive forces and the assistance of downward traction. The posterior shoulder delivered with maternal expulsive forces and the assistance of upward traction. The remainder of the fetus delivered spontaneously.     Upon delivery, the infant was placed on the mothers abdomen and the cord was clamped and cut. Delayed cord clamping was performed.  The infant was noted to cry spontaneously and was moving all extremities appropriately. There was no evidence for injury. Awaiting nurse resuscitators evaluated the . Arterial and venous cord blood gases and cord blood was collected for analysis. These were promptly sent to the lab. In the immediate post-partum, 30 units of IV pitocin was administered, and the uterus was noted to contract down well with massage and pitocin. The placenta delivered spontaneously at 0915 and was noted to have a centrally inserted 3 vessel cord.     The vagina, cervix, perineum, and rectum were inspected and there was noted to be a second degree laceration. It was repaired adequately using 2-0 Vicryl. Excellent hemostasis was noted.    At the conclusion of the procedure, all needle, sponge, and instrument counts were noted to be correct. Patient tolerated the procedure well and was allowed to recover in labor and delivery room with family and  before being transferred to the post-partum floor. Dr. Elliott was present and participated in all key portions of the case.        Lalitha Zapien MD  12/3/2024  9:43 AM

## 2024-12-03 NOTE — OB LABOR/OXYTOCIN SAFETY PROGRESS
Oxytocin Safety Progress Check Note - Cale Stacy 29 y.o. adult MRN: [id number]    Unit/Bed#: -01 Encounter: [id number]    Dose (simon-units/min) Oxytocin: 2 simon-units/min  Contraction Frequency (minutes): 2-3  Contraction Intensity: Moderate  Uterine Activity Characteristics: Irregular  Cervical Dilation: 5        Cervical Effacement: 80  Fetal Station: -1  Baseline Rate (FHR): 135 bpm  Fetal Heart Rate (FHT): 130 BPM  FHR Category: II             Vital Signs:   Vitals:    24 0015   BP: 109/63   Pulse:    Resp:    Temp:    SpO2:      Notes/comments:   Pt resting comfortably with epidural. FHR Category II, intermittent late decels, will reposition. [place] not tracing contractions well. SVE as above. Pit running at 2, continue to titrate to contractions. Will recheck in 2 hours or sooner if clinically indicated. Anticipate . Attending physician Dr. Lexi carranza.     Elaine Miranda MD 12/3/2024 12:57 AM

## 2024-12-03 NOTE — PLAN OF CARE
Problem: BIRTH - VAGINAL/ SECTION  Goal: Fetal and maternal status remain reassuring during the birth process  Description: INTERVENTIONS:  - Monitor vital signs  - Monitor fetal heart rate  - Monitor uterine activity  - Monitor labor progression (vaginal delivery)  - DVT prophylaxis  - Antibiotic prophylaxis  Outcome: Progressing  Goal: Emotionally satisfying birthing experience for mother/fetus  Description: Interventions:  - Assess, plan, implement and evaluate the nursing care given to the patient in labor  - Advocate the philosophy that each childbirth experience is a unique experience and support the family's chosen level of involvement and control during the labor process   - Actively participate in both the patient's and family's teaching of the birth process  - Consider cultural, [employer] and age-specific factors and plan care for the patient in labor  Outcome: Progressing     Problem: Knowledge Deficit  Goal: Verbalizes understanding of labor plan  Description: Assess patient/family/caregiver's baseline knowledge level and ability to understand information.  Provide education via patient/family/caregiver's preferred learning method at appropriate level of understanding.     1. Provide teaching at level of understanding.  2. Provide teaching via preferred learning method(s).  Outcome: Progressing  Goal: Patient/family/caregiver demonstrates understanding of disease process, treatment plan, medications, and discharge instructions  Description: Complete learning assessment and assess knowledge base.  Interventions:  - Provide teaching at level of understanding  - Provide teaching via preferred learning methods  Outcome: Progressing     Problem: Labor & Delivery  Goal: Manages discomfort  Description: Assess and monitor for signs and symptoms of discomfort.  Assess patient's pain level regularly and per hospital policy.  Administer medications as ordered. Support use of nonpharmacological methods to  help control pain such as distraction, imagery, relaxation, and application of heat and cold.  Collaborate with interdisciplinary team and patient to determine appropriate pain management plan.    1. Include patient in decisions related to comfort.  2. Offer non-pharmacological pain management interventions.  3. Report ineffective pain management to physician.  Outcome: Progressing  Goal: Patient vital signs are stable  Description: 1. Assess vital signs - vaginal delivery.  Outcome: Progressing     Problem: PAIN - ADULT  Goal: Verbalizes/displays adequate comfort level or baseline comfort level  Description: Interventions:  - Encourage patient to monitor pain and request assistance  - Assess pain using appropriate pain scale  - Administer analgesics based on type and severity of pain and evaluate response  - Implement non-pharmacological measures as appropriate and evaluate response  - Consider cultural and social influences on pain and pain management  - Notify physician/advanced practitioner if interventions unsuccessful or patient reports new pain  Outcome: Progressing     Problem: INFECTION - ADULT  Goal: Absence or prevention of progression during hospitalization  Description: INTERVENTIONS:  - Assess and monitor for signs and symptoms of infection  - Monitor lab/diagnostic results  - Monitor all insertion sites, i.e. indwelling lines, tubes, and drains  - Monitor endotracheal if appropriate and nasal secretions for changes in amount and color  - [place] appropriate cooling/warming therapies per order  - Administer medications as ordered  - Instruct and encourage patient and family to use good hand hygiene technique  - Identify and instruct in appropriate isolation precautions for identified infection/condition  Outcome: Progressing  Goal: Absence of fever/infection during neutropenic period  Description: INTERVENTIONS:  - Monitor WBC    Outcome: Progressing     Problem: SAFETY ADULT  Goal: Patient will  remain free of falls  Description: INTERVENTIONS:  - Educate patient/family on patient safety including physical limitations  - Instruct patient to call for assistance with activity   - Consult OT/PT to assist with strengthening/mobility   - Keep Call bell within reach  - Keep bed low and locked with side rails adjusted as appropriate  - Keep care items and personal belongings within reach  - Initiate and maintain comfort rounds  - Make Fall Risk Sign visible to staff  - Offer Toileting every  Hours, in advance of need  - Initiate/Maintain alarm  - Obtain necessary fall risk management equipment:   - Apply yellow socks and bracelet for high fall risk patients  - Consider moving patient to room near nurses station  Outcome: Progressing  Goal: Maintain or return to baseline ADL function  Description: INTERVENTIONS:  -  Assess patient's ability to carry out ADLs; assess patient's baseline for ADL function and identify physical deficits which impact ability to perform ADLs (bathing, care of mouth/teeth, toileting, grooming, dressing, etc.)  - Assess/evaluate cause of self-care deficits   - Assess range of motion  - Assess patient's mobility; develop plan if impaired  - Assess patient's need for assistive devices and provide as appropriate  - Encourage maximum independence but intervene and supervise when necessary  - Involve family in performance of ADLs  - Assess for home care needs following discharge   - Consider OT consult to assist with ADL evaluation and planning for discharge  - Provide patient education as appropriate  Outcome: Progressing  Goal: Maintains/Returns to pre admission functional level  Description: INTERVENTIONS:  - Perform AM-PAC 6 Click Basic Mobility/ Daily Activity assessment daily.  - Set and communicate daily mobility goal to care team and patient/family/caregiver.   - Collaborate with rehabilitation services on mobility goals if consulted  - Perform Range of Motion  times a day.  - Reposition  patient every  hours.  - Dangle patient  times a day  - Stand patient  times a day  - Ambulate patient  times a day  - Out of bed to chair  times a day   - Out of bed for meals  times a day  - Out of bed for toileting  - Record patient progress and toleration of activity level   Outcome: Progressing     Problem: DISCHARGE PLANNING  Goal: Discharge to home or other facility with appropriate resources  Description: INTERVENTIONS:  - Identify barriers to discharge w/patient and caregiver  - Arrange for needed discharge resources and transportation as appropriate  - Identify discharge learning needs (meds, wound care, etc.)  - Arrange for interpretive services to assist at discharge as needed  - Refer to Case Management Department for coordinating discharge planning if the patient needs post-hospital services based on physician/advanced practitioner order or complex needs related to functional status, cognitive ability, or social support system  Outcome: Progressing

## 2024-12-04 VITALS
BODY MASS INDEX: 34.96 KG/M2 | OXYGEN SATURATION: 99 % | HEIGHT: 62 IN | HEART RATE: 82 BPM | DIASTOLIC BLOOD PRESSURE: 64 MMHG | SYSTOLIC BLOOD PRESSURE: 107 MMHG | WEIGHT: 190 LBS | TEMPERATURE: 97.8 F | RESPIRATION RATE: 18 BRPM

## 2024-12-04 PROCEDURE — 99024 POSTOP FOLLOW-UP VISIT: CPT | Performed by: OBSTETRICS & GYNECOLOGY

## 2024-12-04 PROCEDURE — NC001 PR NO CHARGE: Performed by: OBSTETRICS & GYNECOLOGY

## 2024-12-04 RX ORDER — ACETAMINOPHEN 325 MG/1
650 TABLET ORAL EVERY 6 HOURS
Qty: 240 TABLET | Refills: 0 | Status: SHIPPED | OUTPATIENT
Start: 2024-12-04

## 2024-12-04 RX ORDER — IBUPROFEN 600 MG/1
600 TABLET, FILM COATED ORAL EVERY 6 HOURS
Qty: 30 TABLET | Refills: 0 | Status: SHIPPED | OUTPATIENT
Start: 2024-12-04

## 2024-12-04 RX ORDER — BENZOCAINE/MENTHOL 6 MG-10 MG
1 LOZENGE MUCOUS MEMBRANE DAILY PRN
Start: 2024-12-04

## 2024-12-04 RX ADMIN — WITCH HAZEL 1 PAD: 500 SOLUTION RECTAL; TOPICAL at 06:28

## 2024-12-04 RX ADMIN — IBUPROFEN 600 MG: 600 TABLET ORAL at 13:24

## 2024-12-04 RX ADMIN — ACETAMINOPHEN 650 MG: 325 TABLET, FILM COATED ORAL at 10:53

## 2024-12-04 RX ADMIN — IBUPROFEN 600 MG: 600 TABLET ORAL at 06:21

## 2024-12-04 RX ADMIN — DOCUSATE SODIUM 100 MG: 100 CAPSULE, LIQUID FILLED ORAL at 10:53

## 2024-12-04 RX ADMIN — HYDROCORTISONE 1 APPLICATION: 1 CREAM TOPICAL at 06:28

## 2024-12-04 NOTE — LACTATION NOTE
This note was copied from a baby's chart.  Follow up Lactation: Cale informed Pediatrician on the floor that she has not been successful in latching Anibal.    Discussion of General Lactation Issues: Mom wants to breastfeed until she returns to work in April, then she wants to pump and breast feed.Inpatient Lactation consult is being closed to due services rendered. Please review previous notes to see feeding plan for the breastfeeding dyad. Please reach out to [facility] for an outpatient lactation appointment. You may contact them at [phone].       Full term:yes - 40 w 0 d        Interval Breastfeeding History:    Frequency of breast feeding: a few attempts since birth - Cale has been hand expressing into Martinez's mouth  Does mother feel breastfeeding is effective: No  Does infant appear satisfied after nursing:Yes  Stooling pattern normal:Yes  Urinating frequently:Yes      Alternative/Artificial Feedings:   Syringe/Finger: Yes, with 1 ml syringes of hand expressed colostrum      Mother Assessment:  Breast: wider space between the breasts. Breasts are conical in shape with light brown areolas and everted, round nipples  Nipple Assessment in General: appear as short shank & round  Mother's Awareness of Feeding Cues                 Recognizes: Yes                  Verbalizes: Yes  Support System: FOB  History of Breastfeeding: first child  Changes/Stressors/Violence: wants to excl. Breast feed exclusively  Concerns/Goals: baby is not latching      Infant Assessment:  Behaviors: Alert and spitty    General Appearance:  Alert,                             Head:  Conical shaped head with visible bruising and modeling in various sections.  AFOF, sutures opposed                            Eyes:   Conjunctiva clear, slight clear drainage out of right eye                            Ears:   Normally placed, sandwiched appearance                           Nose:   Septum intact, no drainage  or erythema                          Mouth:  No lesions. Asymmetrical jaw with Left higher than the right side.                   Neck:  Supple, symmetrical, trachea midline, no adenopathy; thyroid: no enlargement, symmetric, no tenderness/mass/nodules       Musculoskeletal:  C-curvature with a positional preference to the left           Latch Assessment with mother latching:  Efficiency: football on the left breast              Lips Flanged: No              Depth of latch: shallow              Audible Swallow: No              Visible Milk: No              Wide Open/ Asymmetrical: No              Suck Swallow Cycle: Breathing: yes, Coordinated: no  Nipple Assessment after latch: Anibal had pursed lips and no latch achieved.   Latch Problems: alignment has Anibal in front of the breast.     Positional Assessment with mother latching:  Infant's Ergonomics/Body: Football on the Left breast               Body Alignment: No               Head Supported: No               Close to Mom's body/ Lifted/ Supported: No               Mom's Ergonomics/Body: No                           Supported: Yes                           Sitting Back: No                           Brings Baby to her breast: No  Positioning Problems: Cale takes anibal to the breast - no upper back / neck support; neck is angles and Anibal does not open his mouth to latch    Terrell Latch After Lactation Education/Consult:  Efficiency: Football on the L/ R breast              Lips Flanged: Yes              Depth of latch: deep with active tugging sensation noted              Audible Swallow: Yes              Visible Milk: No              Wide Open/ Asymmetrical: Yes              Suck Swallow Cycle: Breathing: yes, Coordinated: 3-7 coordinated sucks noted  Nipple Assessment after latch: Normal: elongated/eraser, no discoloration and no damage noted.  Latch Problems: none with demonstration of alignment and snug hold of the baby at the breast    Education on  "creating a snug hold of your infant to the breast by verifying the infant's cheek is touching the breast, your infant's chin is deep into the breast tissue, your infant's arms are \"hugging\" the breast, and your infant's lips are flanged on the areola. Bring infant to the breast, not your breast to the infant. Latch should feel like a tugging sensation on the nipple.      Position After Lactation Education/Consult:  Infant's Ergonomics/Body; football on the L/R breast               Body Alignment: Yes               Head Supported: Yes, with hand over hand demonstration               Close to Mom's body/ Lifted/ Supported: Yes, enc. To tuck anibal into mom's side               Mom's Ergonomics/Body: Yes                           Supported: Yes, more pillows used to lift Anibal up to the breast                           Sitting Back: Yes                           Brings Baby to her breast: Yes  Positioning Problems: none with hand over hand demonstration and teach back with FOB    Anibal latched to the left breast for 25 min. And the right breast for 20 min. Signs of satiation noted after breast compressions demonstrated.    Mom is encouraged to stay the night due to cluster feeding and circ. Procedure this afternoon    Equipment:    Personal Pump            Type: Spectra 1              Lanolin: provided     Feeding Plan:      Enc. Hand expression prior to every latch.     Demonstration and teach back of postioning up to the breast.    Feedings:  - Recognize early feeding cues (opening mouth, hand to mouth)   - place Pillows to see the nipple and support your lower back and shoulders. Make sure your shoulders are down and back.   - Align nose to nipple, place Anibal's chin on breast to achieve a wide deep latch. Use a snug hold at Anibal's upper shoulders and fingers supporting Anibal's lower jaw.    - Bring baby to breast,not breast to baby (your shoulders down and back - no hunching)   - wait for Anibal to open wide and " tuck Anibal into the side of your breast   - Baby's ear, shoulder, hip in alignment  - Look for flanged lips, no pain with latch and active, coordinated sucking.   - Use breast compressions to assist with milk transfer   - Look for signs of satiation (open hand on breast)   - Allow for non-nutritive suck on the breast   - Allow for breathing breaks and muscle breaks  - Offer both breasts at every feeding      (Scan QR code for [employer] - positions)   Review [employer] on youtube or scan QR code for [employer] video      [employer]        [employer] - positions

## 2024-12-04 NOTE — PLAN OF CARE
Problem: PAIN - ADULT  Goal: Verbalizes/displays adequate comfort level or baseline comfort level  Description: Interventions:  - Encourage patient to monitor pain and request assistance  - Assess pain using appropriate pain scale  - Administer analgesics based on type and severity of pain and evaluate response  - Implement non-pharmacological measures as appropriate and evaluate response  - Consider cultural and social influences on pain and pain management  - Notify physician/advanced practitioner if interventions unsuccessful or patient reports new pain  Outcome: Progressing     Problem: INFECTION - ADULT  Goal: Absence or prevention of progression during hospitalization  Description: INTERVENTIONS:  - Assess and monitor for signs and symptoms of infection  - Monitor lab/diagnostic results  - Monitor all insertion sites, i.e. indwelling lines, tubes, and drains  - Monitor endotracheal if appropriate and nasal secretions for changes in amount and color  - [place] appropriate cooling/warming therapies per order  - Administer medications as ordered  - Instruct and encourage patient and family to use good hand hygiene technique  - Identify and instruct in appropriate isolation precautions for identified infection/condition  Outcome: Progressing  Goal: Absence of fever/infection during neutropenic period  Description: INTERVENTIONS:  - Monitor WBC    Outcome: Progressing     Problem: SAFETY ADULT  Goal: Patient will remain free of falls  Description: INTERVENTIONS:  - Educate patient/family on patient safety including physical limitations  - Instruct patient to call for assistance with activity   - Consult OT/PT to assist with strengthening/mobility   - Keep Call bell within reach  - Keep bed low and locked with side rails adjusted as appropriate  - Keep care items and personal belongings within reach  - Initiate and maintain comfort rounds  - Make Fall Risk Sign visible to staff  - Offer Toileting every 2-3  Hours, in advance of need  - Initiate/Maintain alarm  - Obtain necessary fall risk management equipment:   - Apply yellow socks and bracelet for high fall risk patients  - Consider moving patient to room near nurses station  Outcome: Progressing  Goal: Maintain or return to baseline ADL function  Description: INTERVENTIONS:  -  Assess patient's ability to carry out ADLs; assess patient's baseline for ADL function and identify physical deficits which impact ability to perform ADLs (bathing, care of mouth/teeth, toileting, grooming, dressing, etc.)  - Assess/evaluate cause of self-care deficits   - Assess range of motion  - Assess patient's mobility; develop plan if impaired  - Assess patient's need for assistive devices and provide as appropriate  - Encourage maximum independence but intervene and supervise when necessary  - Involve family in performance of ADLs  - Assess for home care needs following discharge   - Consider OT consult to assist with ADL evaluation and planning for discharge  - Provide patient education as appropriate  Outcome: Progressing  Goal: Maintains/Returns to pre admission functional level  Description: INTERVENTIONS:  - Perform AM-PAC 6 Click Basic Mobility/ Daily Activity assessment daily.  - Set and communicate daily mobility goal to care team and patient/family/caregiver.   - Collaborate with rehabilitation services on mobility goals if consulted- Out of bed for toileting  - Record patient progress and toleration of activity level   Outcome: Progressing     Problem: DISCHARGE PLANNING  Goal: Discharge to home or other facility with appropriate resources  Description: INTERVENTIONS:  - Identify barriers to discharge w/patient and caregiver  - Arrange for needed discharge resources and transportation as appropriate  - Identify discharge learning needs (meds, wound care, etc.)  - Arrange for interpretive services to assist at discharge as needed  - Refer to Case Management Department for  coordinating discharge planning if the patient needs post-hospital services based on physician/advanced practitioner order or complex needs related to functional status, cognitive ability, or social support system  Outcome: Progressing     Problem: POSTPARTUM  Goal: Experiences normal postpartum course  Description: INTERVENTIONS:  - Monitor maternal vital signs  - Assess uterine involution and lochia  Outcome: Progressing  Goal: Appropriate maternal -  bonding  Description: INTERVENTIONS:  - Identify family support  - Assess for appropriate maternal/infant bonding   -Encourage maternal/infant bonding opportunities  - Referral to  or  as needed  Outcome: Progressing  Goal: Establishment of infant feeding pattern  Description: INTERVENTIONS:  - Assess breast/bottle feeding  - Refer to lactation as needed  Outcome: Progressing  Goal: Incision(s), wounds(s) or drain site(s) healing without S/S of infection  Description: INTERVENTIONS  - Assess and document perineum and surrounding tissue  - Provide patient and family education  - Perform skin care/dressing changes   Outcome: Progressing

## 2024-12-04 NOTE — PLAN OF CARE
Problem: POSTPARTUM  Goal: Experiences normal postpartum course  Description: INTERVENTIONS:  - Monitor maternal vital signs  - Assess uterine involution and lochia  Outcome: Progressing  Goal: Appropriate maternal -  bonding  Description: INTERVENTIONS:  - Identify family support  - Assess for appropriate maternal/infant bonding   -Encourage maternal/infant bonding opportunities  - Referral to  or  as needed  Outcome: Progressing  Goal: Establishment of infant feeding pattern  Description: INTERVENTIONS:  - Assess breast/bottle feeding  - Refer to lactation as needed  Outcome: Progressing  Goal: Incision(s), wounds(s) or drain site(s) healing without S/S of infection  Description: INTERVENTIONS  - Assess and document perineum and surrounding tissue  - Provide patient and family education  - Perform skin care/dressing changes   Outcome: Progressing     Problem: PAIN - ADULT  Goal: Verbalizes/displays adequate comfort level or baseline comfort level  Description: Interventions:  - Encourage patient to monitor pain and request assistance  - Assess pain using appropriate pain scale  - Administer analgesics based on type and severity of pain and evaluate response  - Implement non-pharmacological measures as appropriate and evaluate response  - Consider cultural and social influences on pain and pain management  - Notify physician/advanced practitioner if interventions unsuccessful or patient reports new pain  Outcome: Progressing     Problem: INFECTION - ADULT  Goal: Absence or prevention of progression during hospitalization  Description: INTERVENTIONS:  - Assess and monitor for signs and symptoms of infection  - Monitor lab/diagnostic results  - Monitor all insertion sites, i.e. indwelling lines, tubes, and drains  - Monitor endotracheal if appropriate and nasal secretions for changes in amount and color  - [place] appropriate cooling/warming therapies per order  - Administer  medications as ordered  - Instruct and encourage patient and family to use good hand hygiene technique  - Identify and instruct in appropriate isolation precautions for identified infection/condition  Outcome: Progressing  Goal: Absence of fever/infection during neutropenic period  Description: INTERVENTIONS:  - Monitor WBC    Outcome: Progressing     Problem: SAFETY ADULT  Goal: Patient will remain free of falls  Description: INTERVENTIONS:  - Educate patient/family on patient safety including physical limitations  - Instruct patient to call for assistance with activity   - Consult OT/PT to assist with strengthening/mobility   - Keep Call bell within reach  - Keep bed low and locked with side rails adjusted as appropriate  - Keep care items and personal belongings within reach  - Initiate and maintain comfort rounds  - Make Fall Risk Sign visible to staff  - Offer Toileting every 2-3 Hours, in advance of need  - Initiate/Maintain alarm  - Obtain necessary fall risk management equipment:   - Apply yellow socks and bracelet for high fall risk patients  - Consider moving patient to room near nurses station  Outcome: Progressing  Goal: Maintain or return to baseline ADL function  Description: INTERVENTIONS:  -  Assess patient's ability to carry out ADLs; assess patient's baseline for ADL function and identify physical deficits which impact ability to perform ADLs (bathing, care of mouth/teeth, toileting, grooming, dressing, etc.)  - Assess/evaluate cause of self-care deficits   - Assess range of motion  - Assess patient's mobility; develop plan if impaired  - Assess patient's need for assistive devices and provide as appropriate  - Encourage maximum independence but intervene and supervise when necessary  - Involve family in performance of ADLs  - Assess for home care needs following discharge   - Consider OT consult to assist with ADL evaluation and planning for discharge  - Provide patient education as  appropriate  Outcome: Progressing  Goal: Maintains/Returns to pre admission functional level  Description: INTERVENTIONS:  - Perform AM-PAC 6 Click Basic Mobility/ Daily Activity assessment daily.  - Set and communicate daily mobility goal to care team and patient/family/caregiver.   - Collaborate with rehabilitation services on mobility goals if consulted- Out of bed for toileting  - Record patient progress and toleration of activity level   Outcome: Progressing     Problem: DISCHARGE PLANNING  Goal: Discharge to home or other facility with appropriate resources  Description: INTERVENTIONS:  - Identify barriers to discharge w/patient and caregiver  - Arrange for needed discharge resources and transportation as appropriate  - Identify discharge learning needs (meds, wound care, etc.)  - Arrange for interpretive services to assist at discharge as needed  - Refer to Case Management Department for coordinating discharge planning if the patient needs post-hospital services based on physician/advanced practitioner order or complex needs related to functional status, cognitive ability, or social support system  Outcome: Progressing

## 2024-12-04 NOTE — PROGRESS NOTES
"Progress Note - OB/GYN  Cale Stacy 29 y.o. adult MRN: [id number]  Unit/Bed#:  302-01 Encounter: [id number]    Assessment and Plan     Cale Stacy is a patient of: [facility]. She is PPD# 1 s/p  spontaneous vaginal delivery  Recovering well and is stable       *  (spontaneous vaginal delivery)  Assessment & Plan       Continue routine post partum care  Pain well controlled: tylenol/motrin scheduled  Lochia within normal limits: continue to monitor   OOB: as able, encourage ambulation  Passing flatus  Voiding spontaneously          Disposition    - Anticipate discharge home on PPD# 1-2      Subjective/Objective     Chief Complaint: Postpartum State     Subjective:    Cale Stacy is PPD/POD#1 s/p  spontaneous vaginal delivery. She has no current complaints.  Pain is well controlled.  Patient is currently voiding.  She is ambulating.  Patient is currently passing flatus and has had no bowel movement. She is tolerating PO, and denies nausea or vomitting. Patient denies fever, chills, chest pain, shortness of breath, or calf tenderness. Lochia is normal. She is  Breastfeeding. She is recovering well and is stable.       Vitals:   /58 (BP Location: Left arm)   Pulse 69   Temp 97.8 °F (36.6 °C) (Oral)   Resp 18   Ht 5' 2\" (1.575 m)   Wt 86.2 kg (190 lb)   LMP 2024 (Exact Date)   SpO2 98%   Breastfeeding Yes   BMI 34.75 kg/m²       Intake/Output Summary (Last 24 hours) at 2024 0515  Last data filed at 12/3/2024 1430  Gross per 24 hour   Intake 4000 ml   Output 1903 ml   Net 2097 ml       Invasive Devices       Peripheral Intravenous Line  Duration             Peripheral IV 24 Left;Ventral (anterior) Forearm 1 day                    Physical Exam:   GEN: Cale Stacy appears well, alert and oriented x 3, pleasant and cooperative   CARDIO: RRR, no murmurs or rubs  RESP:  CTAB, no wheezes or rales  ABDOMEN: soft, no tenderness, no distention, fundus firm at " umbilicus  EXTREMITIES: SCDs on, non tender, no erythema, b/l Daniel's sign negative      Labs:     Hemoglobin   Date Value Ref Range Status   12/02/2024 12.8 12.0 - 15.4 g/dL Final   08/17/2024 11.5 (L) 12.0 - 15.4 g/dL Final     WBC   Date Value Ref Range Status   12/02/2024 12.59 (H) 4.31 - 10.16 Thousand/uL Final   08/17/2024 12.25 (H) 4.31 - 10.16 Thousand/uL Final     Platelets   Date Value Ref Range Status   12/02/2024 278 149 - 390 Thousands/uL Final   08/17/2024 312 149 - 390 Thousands/uL Final     Creatinine   Date Value Ref Range Status   07/11/2023 0.84 0.60 - 1.30 mg/dL Final     Comment:     Standardized to IDMS reference method     AST   Date Value Ref Range Status   07/11/2023 19 13 - 39 U/L Final     ALT   Date Value Ref Range Status   07/11/2023 12 7 - 52 U/L Final     Comment:     Specimen collection should occur prior to Sulfasalazine administration due to the potential for falsely depressed results.           Lalitha Zapien MD  12/4/2024  5:15 AM

## 2024-12-04 NOTE — PLAN OF CARE
Problem: PAIN - ADULT  Goal: Verbalizes/displays adequate comfort level or baseline comfort level  Description: Interventions:  - Encourage patient to monitor pain and request assistance  - Assess pain using appropriate pain scale  - Administer analgesics based on type and severity of pain and evaluate response  - Implement non-pharmacological measures as appropriate and evaluate response  - Consider cultural and social influences on pain and pain management  - Notify physician/advanced practitioner if interventions unsuccessful or patient reports new pain  12/4/2024 1714 by Dotty Ortiz RN  Outcome: Completed  12/4/2024 1247 by Dotty Ortiz RN  Outcome: Progressing     Problem: INFECTION - ADULT  Goal: Absence or prevention of progression during hospitalization  Description: INTERVENTIONS:  - Assess and monitor for signs and symptoms of infection  - Monitor lab/diagnostic results  - Monitor all insertion sites, i.e. indwelling lines, tubes, and drains  - Monitor endotracheal if appropriate and nasal secretions for changes in amount and color  - Cooper appropriate cooling/warming therapies per order  - Administer medications as ordered  - Instruct and encourage patient and family to use good hand hygiene technique  - Identify and instruct in appropriate isolation precautions for identified infection/condition  12/4/2024 1714 by Dotty Ortiz RN  Outcome: Completed  12/4/2024 1247 by Dotty Ortiz RN  Outcome: Progressing  Goal: Absence of fever/infection during neutropenic period  Description: INTERVENTIONS:  - Monitor WBC    12/4/2024 1714 by Dotty Ortiz RN  Outcome: Completed  12/4/2024 1247 by Dotty Ortiz RN  Outcome: Progressing     Problem: SAFETY ADULT  Goal: Patient will remain free of falls  Description: INTERVENTIONS:  - Educate patient/family on patient safety including physical limitations  - Instruct patient to call for assistance with activity   - Consult OT/PT to assist with  strengthening/mobility   - Keep Call bell within reach  - Keep bed low and locked with side rails adjusted as appropriate  - Keep care items and personal belongings within reach  - Initiate and maintain comfort rounds  - Make Fall Risk Sign visible to staff  - Offer Toileting every 2-3 Hours, in advance of need  - Initiate/Maintain alarm  - Obtain necessary fall risk management equipment:   - Apply yellow socks and bracelet for high fall risk patients  - Consider moving patient to room near nurses station  12/4/2024 1714 by Dotty Ortiz RN  Outcome: Completed  12/4/2024 1247 by Dotty Ortiz RN  Outcome: Progressing  Goal: Maintain or return to baseline ADL function  Description: INTERVENTIONS:  -  Assess patient's ability to carry out ADLs; assess patient's baseline for ADL function and identify physical deficits which impact ability to perform ADLs (bathing, care of mouth/teeth, toileting, grooming, dressing, etc.)  - Assess/evaluate cause of self-care deficits   - Assess range of motion  - Assess patient's mobility; develop plan if impaired  - Assess patient's need for assistive devices and provide as appropriate  - Encourage maximum independence but intervene and supervise when necessary  - Involve family in performance of ADLs  - Assess for home care needs following discharge   - Consider OT consult to assist with ADL evaluation and planning for discharge  - Provide patient education as appropriate  12/4/2024 1714 by Dotty Ortiz RN  Outcome: Completed  12/4/2024 1247 by Dotty Ortiz RN  Outcome: Progressing  Goal: Maintains/Returns to pre admission functional level  Description: INTERVENTIONS:  - Perform AM-PAC 6 Click Basic Mobility/ Daily Activity assessment daily.  - Set and communicate daily mobility goal to care team and patient/family/caregiver.   - Collaborate with rehabilitation services on mobility goals if consulted- Out of bed for toileting  - Record patient progress and toleration of  activity level   2024 by Dotty Ortiz RN  Outcome: Completed  2024 124 by Dotty Ortiz RN  Outcome: Progressing     Problem: DISCHARGE PLANNING  Goal: Discharge to home or other facility with appropriate resources  Description: INTERVENTIONS:  - Identify barriers to discharge w/patient and caregiver  - Arrange for needed discharge resources and transportation as appropriate  - Identify discharge learning needs (meds, wound care, etc.)  - Arrange for interpretive services to assist at discharge as needed  - Refer to Case Management Department for coordinating discharge planning if the patient needs post-hospital services based on physician/advanced practitioner order or complex needs related to functional status, cognitive ability, or social support system  2024 by Dotty Ortiz RN  Outcome: Completed  2024 by Dotty Ortiz RN  Outcome: Progressing     Problem: POSTPARTUM  Goal: Experiences normal postpartum course  Description: INTERVENTIONS:  - Monitor maternal vital signs  - Assess uterine involution and lochia  2024 by Dotty Ortiz RN  Outcome: Completed  2024 1247 by Dotty Ortiz RN  Outcome: Progressing  Goal: Appropriate maternal -  bonding  Description: INTERVENTIONS:  - Identify family support  - Assess for appropriate maternal/infant bonding   -Encourage maternal/infant bonding opportunities  - Referral to  or  as needed  2024 by Dotty Ortiz RN  Outcome: Completed  2024 1247 by Dotyt Ortiz RN  Outcome: Progressing  Goal: Establishment of infant feeding pattern  Description: INTERVENTIONS:  - Assess breast/bottle feeding  - Refer to lactation as needed  2024 by Dotty Ortiz RN  Outcome: Completed  2024 124 by Dotty Ortiz RN  Outcome: Progressing  Goal: Incision(s), wounds(s) or drain site(s) healing without S/S of infection  Description: INTERVENTIONS  - Assess and document  perineum and surrounding tissue  - Provide patient and family education  - Perform skin care/dressing changes   12/4/2024 1714 by Dotty Ortiz RN  Outcome: Completed  12/4/2024 1247 by Dotty Ortiz, RN  Outcome: Progressing

## 2024-12-04 NOTE — PROGRESS NOTES
D/C instructions gone over with patient and spouse who verbalized understanding and denied further questions.

## 2024-12-05 NOTE — UTILIZATION REVIEW
"NOTIFICATION OF INPATIENT ADMISSION   MATERNITY/DELIVERY AUTHORIZATION REQUEST   SERVICING FACILITY:   [facility]ly [facility]  Parent Child Health - L&D, [place], NICU  [address] [place] [address]  Tax ID: [id number]  NPI: [id number]   ATTENDING PROVIDER:  Attending Name and NPI#: Asha Lawrence Do [[id number]]  Address: [address] [place] [address]  Phone: [phone]   ADMISSION INFORMATION:  Place of Service: Inpatient [facility]  Place of Service Code: 21  Inpatient Admission Date/Time: 24 12:35 PM  Discharge Date/Time: 2024  5:20 PM  Admitting Diagnosis Code/Description:  39 weeks gestation of pregnancy [Z3A.39]  Vaginal bleeding affecting early pregnancy [O20.9]  Encounter for full-term uncomplicated delivery [O80]     Mother: Cale Stacy 1995 Estimated Date of Delivery: 12/3/24  Delivering clinician: Fariba Elliott   OB History          1    Para   1    Term   1       0    AB   0    Living   1         SAB   0    IAB   0    Ectopic   0    Multiple   0    Live Births   1           Obstetric Comments   Menarche 12              Name & MRN:   Information for the patient's :  Regis Baby Boy (Cale) [[id number]]    Delivery Information:  Sex: male  Delivered 12/3/2024 9:12 AM by Vaginal, Spontaneous; Gestational Age: 40w0d    [place] Measurements:  Weight: 8 lb 1.1 oz (3660 g);  Height: 19.5\"    APGAR 1 minute 5 minutes 10 minutes   Totals: 8 9       UTILIZATION REVIEW CONTACT:  Ana Prealta Utilization   Network Utilization Review Department  Phone: [phone]  Fax [phone]  Email: [email or web address]  Contact for approvals/pending authorizations, clinical reviews, and discharge.     PHYSICIAN ADVISORY SERVICES:  Medical Necessity Denial & Fqec-mq-Fvov Review  Phone: [phone]  Fax: [phone]  Email: [email or web address]     DISCHARGE SUPPORT " TEAM:  For Patients Discharge Needs & Updates  Phone: [phone] opt. 2 Fax: [phone]  Email: [email or web address]        NOTIFICATION OF ADMISSION DISCHARGE   This is a Notification of Discharge from [facility]. Please be advised that this patient has been discharge from our facility. Below you will find the admission and discharge date and time including the patient’s disposition.   UTILIZATION REVIEW CONTACT:  Ana Peralta  Utilization   Network Utilization Review Department  Phone: [phone] x carefully listen to the prompts. All voicemails are confidential.  Email: [email or web address]     ADMISSION INFORMATION  PRESENTATION DATE: 12/2/2024 10:48 AM  OBERVATION ADMISSION DATE: N/A  INPATIENT ADMISSION DATE: 12/2/24 12:35 PM   DISCHARGE DATE: 12/4/2024  5:20 PM   DISPOSITION:Home/Self Care    Network Utilization Review Department  ATTENTION: Please call with any questions or concerns to [phone] and carefully listen to the prompts so that you are directed to the right person. All voicemails are confidential.   For Discharge needs, contact Care Management DC Support Team at [phone] opt. 2  Send all requests for admission clinical reviews, approved or denied determinations and any other requests to dedicated fax number below belonging to the campus where the patient is receiving treatment. List of dedicated fax numbers for the Facilities:  FACILITY NAME UR FAX NUMBER   ADMISSION DENIALS (Administrative/Medical Necessity) [phone]   DISCHARGE SUPPORT TEAM ([facility]) [phone]   PARENT CHILD HEALTH (Maternity/NICU/Pediatrics) [phone]   [facility] [phone]   [facility] [phone]   [facility] [phone]   [facility] [phone]   [facility] [phone]   [facility]  [place] [phone]   [facility] [phone]   [facility] [phone]   [facility] [phone]   [facility] [phone]   [facility] [phone]   [facility] [phone]

## 2024-12-09 LAB — PLACENTA IN STORAGE: NORMAL

## 2024-12-18 ENCOUNTER — OFFICE VISIT (OUTPATIENT)
Dept: POSTPARTUM | Facility: CLINIC | Age: 29
End: 2024-12-18
Payer: COMMERCIAL

## 2024-12-18 PROCEDURE — 99404 PREV MED CNSL INDIV APPRX 60: CPT | Performed by: PEDIATRICS

## 2024-12-18 NOTE — PROGRESS NOTES
INITIAL BREAST FEEDING EVALUATION    Informant/Relationship: Cale    Discussion of General Lactation Issues: Cale  feels that Anibal is latching well but he falls asleep quickly while feeding.  He lost weight after birth.  When he was about a week old, peds recommended limiting his time at the breast to 10 minutes each breast and then to supplement with 1-2 ounces of expressed milk and formula after feeding.  His weight gain improved once supplementation was begun. A few times, Cale has tried transitioning back to just breastfeeding but Anibal is not content after nursing.  Since yesterday, she is exclusively pumping instead to get a better idea of where her milk production is.  She feels that she would like to pump for feedings instead of breastfeeding for most of his feedings but would like to breastfeed at times.    Infant is 2 weeks old today.        History:  Fertility Problem:no  Breast changes:yes - breasts were walker and tender, areolas were larger and darker, prominent veining  : yes - elective IOL . Prolonged second stage of labor  Full term:yes - 40 weeks   labor:no  First nursing/attempt < 1 hour after birth: Yes, attempted in the delivery room  Skin to skin following delivery:yes - in the delivery room after Anibal was examined  Breast changes after delivery:yes - breasts are walker.  Milk was in by day 4  Rooming in (infant in room with mother with exception of procedures, eg. Circumcision:  went to the NBN his first night while mom slept. Fed colostrum via syringe while there  Blood sugar issues:no  NICU stay:no  Jaundice: yes  Phototherapy:no  Supplement given: (list supplement and method used as well as reason(s):yes - expressed colostrum via syringe    Past Medical History:   Diagnosis Date    Allergic rhinitis     Scoliosis     Varicella     vaccinated         Current Outpatient Medications:     acetaminophen (TYLENOL) 325 mg tablet, Take 2 tablets (650 mg total) by mouth every  6 (six) hours, Disp: 240 tablet, Rfl: 0    benzocaine-menthol-lanolin-aloe (DERMOPLAST) 20-0.5 % topical spray, Apply 1 Application topically every 6 (six) hours as needed for irritation, Disp: , Rfl:     hydrocortisone 1 % cream, Apply 1 Application topically daily as needed for irritation or rash, Disp: , Rfl:     ibuprofen (MOTRIN) 600 mg tablet, Take 1 tablet (600 mg total) by mouth every 6 (six) hours, Disp: 30 tablet, Rfl: 0    Prenatal Vit-Fe Fumarate-FA (PRENATAL PO), Take by mouth, Disp: , Rfl:     witch hazel-glycerin (TUCKS) topical pad, Apply 1 Pad topically every 4 (four) hours as needed for irritation, Disp: , Rfl:     Allergies   Allergen Reactions    Augmentin [Amoxicillin-Pot Clavulanate] Diarrhea       Social History     Substance and Sexual Activity   Drug Use No       Social History Never a smoker    Interval Breastfeeding History:  Frequency of breast feeding: Prior to the last 24 hours, was breastfeeding every 2-3 hours.  At times, needed to be woken up for feedings.  Has not  for the last 24 hours or so  Does mother feel breastfeeding is effective: Yes  Does infant appear satisfied after nursing:No  Stooling pattern normal: Yes  Urinating frequently:Yes  Using shield or shells: No    Alternative/Artificial Feedings:   Bottle: Yes, for the last 24 hours, Anibal is exclusively bottle fed.  Using a Dr Sy's bottle.  Not familiar with paced feeding technique.  No concerns with bottle feeding.  Cup: No  Syringe/Finger: No           Formula Type: Similac Advance 360 Total Care                     Amount: 2.75 ounce when expressed milk is not available            Breast Milk:                      Amount: 2.75 ounces when available (about 5 times a day)            Frequency Q 3-4 Hr between feedings  Elimination Problems: No      Equipment:  Nipple Shield             Type: none             Size: n/a             Frequency of Use: n/a  Pump            Type: Spectra S2, Mom Cozy S9Pro             Frequency of Use: In the last 24 hours, has pumped 5 times.  Went 9 hours overnight without pumping.  Expresses roughly 2 ounces each time she pumps.  Shells            Type: none            Frequency of use: n/a    Equipment Problems: no    Mom:  Breast: Medium sized symmetrical breasts.  Slightly conical shape.  Closely spaced.  Fairly soft.  Nipple Assessment in General: Normal: elongated/eraser, no discoloration and no damage noted.  Mother's Awareness of Feeding Cues                 Recognizes: Yes                  Verbalizes: Yes  Support System: FOB, extended family  History of Breastfeeding: none  Changes/Stressors/Violence: Cale is concerned that Anibal is not content after breastfeeding and that she has been unable to express all of the milk he needs since transitioning to EP.   Concerns/Goals: Cale desires to exclusively breastmilk feed.  She would like to bottle feed at times and breastfeed as desired.    Problems with Mom: concerns with milk production    Physical Exam  Constitutional:       Appearance: Normal appearance.   HENT:      Head: Normocephalic and atraumatic.      Nose: Nose normal.   Pulmonary:      Effort: Pulmonary effort is normal.   Musculoskeletal:         General: Normal range of motion.      Cervical back: Normal range of motion and neck supple.   Neurological:      Mental Status: She is alert and oriented to person, place, and time.   Skin:     General: Skin is warm and dry.   Psychiatric:         Mood and Affect: Mood normal.         Behavior: Behavior normal.         Thought Content: Thought content normal.         Judgment: Judgment normal.         Infant:  Behaviors: Alert  Color: Normal  Birth weight: 3660 grams  Current weight: 3490 grams    Problems with infant: not content after nursing, slow weight gain, restricted tongue movement and function      General Appearance:  Alert, active, no distress                             Head:  significant cranial molding, AFOF, sutures  opposed                             Eyes:  Conjunctiva clear, no drainage                              Ears:  Normally placed, no anomolies                             Nose:  No drainage or erythema                           Mouth:  No lesions. Facial asymmetry. The left cheek is walker and rounder. The right cheek and jaw is more angular.  Recessed chin.  Sucking calluses along both lips.  Narrow gape. The tongue did not extend beyond the lower alveolar ridge, did not lateralize at all and lift is restricted.  Over time, some effective cupping was felt as he sucked on my finger and occasionally, some peristalsis was felt.  Most of the time, the tongue and jaw moved together like a piston to compress my finger. The lingual frenulum is thin, short, attached posterior to the tip of the tongue.  3/4 of the tongue is connected to the floor of the mouth by the frenulum. There is a webbed attachment along the base of the lower alveolar ridge.                     Neck:  significant preference to turn his chin to his left shoulder with his right ear tilted down to the right shoulder,  symmetrical, trachea midline                 Respiratory:  No grunting, flaring, retractions, breath sounds clear and equal            Cardiovascular:  Regular rate and rhythm. No murmur. Adequate perfusion/capillary refill. Femoral pulse present                    Abdomen:   Soft, non-tender, no masses, bowel sounds present, no HSM             Genitourinary:  Normal male, testes descended, no discharge, swelling, or pain, anus patent                          Spine:   No abnormalities noted        Musculoskeletal:  Full range of motion          Skin/Hair/Nails:   Skin warm, dry, and intact, no rashes or abnormal dyspigmentation or lesions                Neurologic:   No abnormal movement, tone appropriate for gestational age     Latch:  Efficiency:               Lips Flanged: The upper lip flanges. The lower lip pulled into his mouth as  "he sucked.                Depth of latch: good, initially latch was a little shallow.  He popped off a few times and his cheeks dimpled as he sucked.              Audible Swallow: Yes, several sustained sucking bursts observed              Visible Milk: Yes              Wide Open/ Asymmetrical: Yes, eventually              Suck Swallow Cycle: Breathing: unlabored, Coordinated: yes  Nipple Assessment after latch: Normal: elongated/eraser, no discoloration and no damage noted.  Latch Problems: Anibal was able to latch fairly well in biological positioning with shaping of the breast.  He was able to feed well on both breasts.  A few minutes into feeding on the left breast, Cale developed some discomfort in her nipple.  She felt as if Anibal was \"biting\" her nipple.  Otherwise, this was a comfortable feeding for her.  Anibal was completely relaxed and content after feeding. Cale's breasts felt more comfortable after the feeding.    Position:  Infant's Ergonomics/Body               Body Alignment: Yes               Head Supported: Yes               Close to Mom's body/ Lifted/ Supported: Yes               Mom's Ergonomics/Body: Yes                           Supported: Yes                           Sitting Back: Yes                           Brings Baby to her breast: Yes  Positioning Problems: After education, Cale was able to confidently and effectively position Anibal in biological hold.        Handouts:   Paced bottle feeding and Latch Check List    Education:  Reviewed Latch: Demonstrated how to gently compress the breast and align the baby so that his nose is just above the nipple with his lower lip and chin touching the breast to encourage the deepest, widest, off-center latch. Discussed that Anibal has some restricted tongue movement and function which may be impacting his ability to transfer milk effectively while feeding at the breast  Reviewed Positioning for Dyad: Demonstrated how to position mom comfortably and " supported with her baby belly to belly prior to bringing her baby to her breast.  Reviewed Frequency/Supply & Demand: Discussed how milk production is established and regulated  Reviewed Infant:Cues and varied States of Awareness  Reviewed Alternative/Artificial Feedings: discussed and demonstrated paced bottle feeding.  Reviewed Mom/Breast care: Discussed appropriate handling of the breasts to avoid pain, inflammation and trauma.   Reviewed Equipment: Discussed and demonstrated the use and features of the spectra S1 and how to determine what size flange to use.      Plan:    Reassurance and support given.  I acknowledged Cale's concerns and her commitment to breastfeeding.  I encouraged her to breastfeed a couple of times a day for practice and for bonding with her baby.  I recommended that she continue pumping when a feeding at the breast is missed, avoiding intervals longer than 3 hours during the day and 4-5 hours overnight without pumping or breastfeeding.  She was taught effective use of her pump and how to determine what size flange to use.  We discussed galactagogues that may help increase milk production to help her meet her milk production goal of exclusive breastmilk feeding.  A follow up appointment was scheduled for ongoing evaluation and support.  An appointment was also scheduled with Dr Glae for more support for milk production as needed.  I encouraged Cale to call with any questions or concerns.    I have spent 90 minutes with Patient and family today in which greater than 50% of this time was spent in counseling/coordination of care regarding Patient and family education and Counseling / Coordination of care.

## 2024-12-18 NOTE — PATIENT INSTRUCTIONS
"Continue breastfeeding as often as you feel comfortable.   Pump when a feeding at the breast is missed or at least every 3 hours during the day and every 4-5 hours at night.  When pumping, cycle your pump through stimulation and expression mode several times in a session to stimulate several let downs until you have expressed enough milk to feed the baby and to achieve breast comfort.  There is no need to \"empty\" the breast completely. Use gentle hands on pumping and hand expression   Maintain your pump as recommended. Use flange that fits comfortably and allows the breast to empty effectively.    Follow up as scheduled.  Please call with any questions or concerns.  "

## 2024-12-22 NOTE — PROGRESS NOTES
I have reviewed the notes, assessments, and/or procedures performed by Mey Martinez RN, IBCLC, I concur with her/his documentation of Cale Gale MD 12/22/24

## 2024-12-31 ENCOUNTER — OFFICE VISIT (OUTPATIENT)
Dept: POSTPARTUM | Facility: CLINIC | Age: 29
End: 2024-12-31
Payer: COMMERCIAL

## 2024-12-31 DIAGNOSIS — R52 PAIN AGGRAVATED BY BREAST FEEDING: ICD-10-CM

## 2024-12-31 DIAGNOSIS — O92.79 PAIN AGGRAVATED BY BREAST FEEDING: ICD-10-CM

## 2024-12-31 PROCEDURE — 99404 PREV MED CNSL INDIV APPRX 60: CPT | Performed by: PEDIATRICS

## 2024-12-31 NOTE — PROGRESS NOTES
"BREAST FEEDING FOLLOW UP VISIT    Informant/Relationship: Cale    Discussion of General Lactation Issues: Cale is feeling more confident.  She continues to attempt breastfeeding every day and continues to pump as needed to obtain milk for bottle feeding.  She has some questions about pumping as she is unsure about knowing when her breasts are \"empty\".     Infant is 4 weeks old today.    Interval Breastfeeding History:  Frequency of breast feeding: Attempting 1-2 times a day.    Does mother feel breastfeeding is effective: No  Does infant appear satisfied after nursing:No  Stooling pattern normal: Yes  Urinating frequently:Yes  Using shield or shells: No     Alternative/Artificial Feedings:   Bottle: Yes, Anibal is exclusively bottle fed.  Using a [place] bottle with paced feeding technique.  No concerns with bottle feeding.  Cup: No  Syringe/Finger: No           Formula Type: Similac Advance 360 Total Care                     Amount: a few ounces per day if expressed milk is not available            Breast Milk:                      Amount: up to 3-4 ounces            Frequency Q 3-4 Hr between feedings  Elimination Problems: No        Equipment:  Nipple Shield             Type: none             Size: n/a             Frequency of Use: n/a  Pump            Type: Spectra S2, Mom Cozy S9 Pro            Frequency of Use: Every 3 hours ATC.  Expresses  ml per pumping session.  Shells            Type: none            Frequency of use: n/a     Equipment Problems: no     Mom:  Breast: Medium sized symmetrical breasts.  Slightly conical shape.  Closely spaced.   Nipple Assessment in General: Normal: elongated/eraser, no discoloration and no damage noted.  Mother's Awareness of Feeding Cues                 Recognizes: Yes                  Verbalizes: Yes  Support System: FOB, extended family  History of Breastfeeding: none  Changes/Stressors/Violence: Cale is concerned that Anibal breastfeeding effectively, that " breastfeeding is painful and that she has been unable to express all of the milk he needs.  Concerns/Goals: Cale desires to exclusively breastmilk feed.  She would like to bottle feed at times and breastfeed as desired.     Problems with Mom: concerns with milk production     Physical Exam  Constitutional:       Appearance: Normal appearance.   HENT:      Head: Normocephalic and atraumatic.      Nose: Nose normal.   Pulmonary:      Effort: Pulmonary effort is normal.   Musculoskeletal:         General: Normal range of motion.      Cervical back: Normal range of motion and neck supple.   Neurological:      Mental Status: She is alert and oriented to person, place, and time.   Skin:     General: Skin is warm and dry.   Psychiatric:         Mood and Affect: Mood normal.         Behavior: Behavior normal.         Thought Content: Thought content normal.         Judgment: Judgment normal.            Infant:  Behaviors: Alert  Color: Normal  Birth weight: 3660 grams  Current weight: 3945 grams     Problems with infant: not content after nursing, slow weight gain, restricted tongue movement and function        General Appearance:  Alert, active, no distress                             Head:  significant cranial molding, AFOF, sutures opposed                             Eyes:  Conjunctiva clear, scant amount of purulent drainage OS                              Ears:  Normally placed, no anomolies                             Nose:  No drainage or erythema                           Mouth:  No lesions. Facial asymmetry. The left cheek is walker and rounder. The right cheek and jaw is more angular.  Recessed chin.  Sucking calluses along both lips.  Narrow gape. The tongue did not extend beyond the lower alveolar ridge, did not lateralize much at all and lift is restricted.  Poor cupping observed as he sucked on my finger and the tongue and jaw moved together like a piston to compress my finger. The lingual frenulum is thin,  short, attached posterior to the tip of the tongue.  3/4 of the tongue is connected to the floor of the mouth by the frenulum. There is a webbed attachment along the base of the lower alveolar ridge.                              Neck:  significant preference to turn his chin to his left shoulder with his right ear tilted down to the right shoulder,  symmetrical, trachea midline                 Respiratory:  No grunting, flaring, retractions, breath sounds clear and equal            Cardiovascular:  Regular rate and rhythm. No murmur. Adequate perfusion/capillary refill. Femoral pulse present                    Abdomen:   Soft, non-tender, no masses, bowel sounds present, no HSM             Genitourinary:  Normal male, testes descended, no discharge, swelling, or pain, anus patent                          Spine:   No abnormalities noted        Musculoskeletal:  Full range of motion          Skin/Hair/Nails:   Skin warm, dry, and intact, no rashes or abnormal dyspigmentation or lesions                Neurologic:   No abnormal movement, tone appropriate for gestational age     Latch:  Efficiency:               Lips Flanged: Yes, initially but his lower lip pulled into his mouth as he sucked.               Depth of latch: deep but popped off a few times              Audible Swallow: Yes, periods of sustained SSB              Visible Milk: Yes              Wide Open/ Asymmetrical: Yes              Suck Swallow Cycle: Breathing: unlabored, Coordinated: yes  Nipple Assessment after latch: reverse lip stick shape, slight blanching  Latch Problems: Anibal latched remarkably well.  There were periods of sustained, effective SSB.  Periodically, Cale felt that Anibal was biting as he sucked.  Once flow slowed, the feeding became uncomfortable and Anibal was switched to the other breast.  He fed on the second breast until he was content and released the breast on his own.  He remained completely relaxed and content for the  rest of the visit.    Position:  Infant's Ergonomics/Body               Body Alignment: Yes               Head Supported: Yes               Close to Mom's body/ Lifted/ Supported: Yes               Mom's Ergonomics/Body: Yes                           Supported: Yes                           Sitting Back: Yes                           Brings Baby to her breast: Yes  Positioning Problems: None      Handouts:   None    Education:  Reviewed Latch: reviewed with Cale that Anibal's restricted tongue movement is making latching and suckling effectively at the breast more difficult.  Reviewed Frequency/Supply & Demand: discussed supplements that can help increase milk production        Plan:    Reassurance and support given.  I acknowledged Cale's concerns and her commitment to providing milk for her baby.  I encouraged her to continue breastfeeding as often as she desires and pumping to compensate for missed feedings at the breast.   I reviewed the galactagogues we discussed at our last visits at her request as she is not yet meeting her goal of exclusive breast milk feeding.  A follow up appointment was scheduled for ongoing evaluation and support.  I encouraged her to call with any questions or concerns.    I have spent 60 minutes with Patient and family today in which greater than 50% of this time was spent in counseling/coordination of care regarding Patient and family education and Counseling / Coordination of care.

## 2024-12-31 NOTE — PATIENT INSTRUCTIONS
Continue breastfeeding as often as you desire.  Continue pumping when a feeding at the breast is missed.  Follow up as scheduled.  Please call with any questions or concerns.

## 2025-01-01 NOTE — PROGRESS NOTES
I have reviewed the notes, assessments, and/or procedures performed by Mey Martinez RN, IBCLC, I concur with her/his documentation of Cale Gale MD 12/31/24

## 2025-01-07 ENCOUNTER — OFFICE VISIT (OUTPATIENT)
Dept: POSTPARTUM | Facility: CLINIC | Age: 30
End: 2025-01-07
Payer: COMMERCIAL

## 2025-01-07 VITALS — SYSTOLIC BLOOD PRESSURE: 110 MMHG | DIASTOLIC BLOOD PRESSURE: 62 MMHG

## 2025-01-07 PROCEDURE — 99215 OFFICE O/P EST HI 40 MIN: CPT | Performed by: PEDIATRICS

## 2025-01-07 PROCEDURE — 99402 PREV MED CNSL INDIV APPRX 30: CPT | Performed by: PEDIATRICS

## 2025-01-07 NOTE — PATIENT INSTRUCTIONS
"Breastfeed on demand as comfortable    Express breast milk whenever Anibal is fed by bottle and as needed, for comfort.    Additional milk expression, early in the day immediately after breastfeeding, is a great way to build and maintain a \"safety net\" for planned and unplanned separations.   "

## 2025-01-07 NOTE — PROGRESS NOTES
"BREAST FEEDING FOLLOW UP VISIT    Informant/Relationship: Cale and Joni/mom and dad    Discussion of General Lactation Issues: Cale says things are improving, especially after working with lactation, speech, and PT. At speech therapy, they did a weighted feed last week, so Cale is offering the breast more often, but she is having pain. Initial latch is 5/10 and the rest of the feeding is 2/10, but the end pain increases to 5/10. The speech therapist told Cale that her nipples were \"lipstick\" shaped when Anibal was done.     Infant is 35 days old today.    Interval Breastfeeding History:    Frequency of breast feeding: close to every 3 hours, but twice/day he is fed by bottle only  Does mother feel breastfeeding is effective: If no, explain: he takes too long  Does infant appear satisfied after nursing:Yes  Stooling pattern normal:Yes  Urinating frequently:Yes  Using shield or shells:No    Alternative/Artificial Feedings:   Bottle: Yes, pigeon bottles/nipples; lansinoh bottles/pigeon nipples; paced/flow regulation feeding  Cup: No  Syringe/Finger: No           Formula Type: Similac 360 Total Care                     Amount: none for the last 4 days            Breast Milk:                      Amount: 3-4            Frequency about 2x/day  Elimination Problems: No      Equipment:  Nipple Shield             Type: n/a             Size: n/a             Frequency of Use: n/a  Pump            Type: primarily uses Spectra S1, also has MomCozy S9 Pro            Frequency of Use: when he gets the bottle (2 x/day) and typically an additional one or two times/day after direct feeding  Shells            Type: n/a            Frequency of use: n/a    Equipment Problems: no      Mom:  Breast: Normal  Nipple Assessment in General: Normal: elongated/eraser, no discoloration and no damage noted.  Mother's Awareness of Feeding Cues                 Recognizes: Yes                  Verbalizes: Yes  Support System: FOB, extended " family  History of Breastfeeding: none  Changes/Stressors/Violence: Cale is concerned that Anibal is not effective at the breast and that it breastfeeding hurts.  Concerns/Goals: Cale wishes to exclusively breast milk feed; she wishes to directly feed primarily when available and without pain    Problems with Mom: Painful breastfeeding    Physical Exam  Constitutional:       Appearance: Normal appearance. She is well-developed and normal weight.   HENT:      Head: Normocephalic and atraumatic.   Eyes:      Extraocular Movements: Extraocular movements intact.   Neck:      Thyroid: No thyromegaly.   Cardiovascular:      Rate and Rhythm: Normal rate and regular rhythm.      Pulses: Normal pulses.      Heart sounds: Normal heart sounds. No murmur heard.  Pulmonary:      Effort: Pulmonary effort is normal.      Breath sounds: Normal breath sounds.   Musculoskeletal:      Cervical back: Normal range of motion and neck supple.   Lymphadenopathy:      Cervical: No cervical adenopathy.      Upper Body:      Right upper body: No pectoral adenopathy.      Left upper body: No pectoral adenopathy.   Neurological:      General: No focal deficit present.      Mental Status: She is alert and oriented to person, place, and time.   Psychiatric:         Mood and Affect: Mood normal.         Behavior: Behavior normal.         Thought Content: Thought content normal.         Judgment: Judgment normal.   Vitals and nursing note reviewed.         Infant:  Behaviors: Alert  Color: Healthy  Birth weight: 3.66 kg  Current weight: 4.133 kg    Problems with infant: Restricted tongue movement      General Appearance:  Alert, active, no distress                             Head:  Normocephalic, AFOF, sutures opposed                             Eyes:  Conjunctiva clear, no drainage                              Ears:  Normally placed, no anomolies                             Nose:  Septum intact, no drainage or erythema                            Mouth:  No lesions; recessed chin; chapping along both lips; tongue does not extend to the lower lip, has very limited lateralization; very limited cupping of the examiner's finger and only tandem movement of the jaw and tongue without peristalsis noted; seal is easily broken with traction placed on the mandible; the frenulum is thin, short, and inelastic inserting to the tongue so that 1/4 of the tongue blade is free and to the mid inferior alveolar ridge                    Neck:  Supple, symmetrical, trachea midline, no adenopathy; thyroid: no enlargement, symmetric, no tenderness/mass/nodules                 Respiratory:  No grunting, flaring, retractions, breath sounds clear and equal            Cardiovascular:  Regular rate and rhythm. No murmur. Adequate perfusion/capillary refill. Femoral pulse present                    Abdomen:   Soft, non-tender, no masses, bowel sounds present, no HSM             Genitourinary:  Normal male, testes descended, no discharge, swelling, or pain, anus patent                          Spine:   No abnormalities noted        Musculoskeletal:  Full range of motion          Skin/Hair/Nails:   Skin warm, dry, and intact, no rashes or abnormal dyspigmentation or lesions                Neurologic:   No abnormal movement, tone appropriate for gestational age     Latch:  Efficiency:               Lips Flanged: Yes, after frenotomy              Depth of latch: Very good, after frenotomy              Audible Swallow: Yes, sustained SSB after frenotomy              Visible Milk: Yes, after frenotomy              Wide Open/ Asymmetrical: Yes, after frenotomy              Suck Swallow Cycle: Breathing: Unlabored, Coordinated: Yes  Nipple Assessment after latch: Normal: elongated/eraser, no discoloration and no damage noted.  Latch Problems: After the frenotomy, Cale easily assists Anibal to a wider, deeper, more asymmetrical, and more comfortable attachment where he breastfeeds until  "fully content.     Position:  Infant's Ergonomics/Body               Body Alignment: Yes               Head Supported: Yes               Close to Mom's body/ Lifted/ Supported: Yes               Mom's Ergonomics/Body: Yes                           Supported: Yes                           Sitting Back: Yes                           Brings Baby to her breast: Yes  Positioning Problems: None        Education:  Reviewed Latch: Reviewed how to gently compress the breast as if offering a sandwich to facilitate a deeper latch.    Reviewed Positioning for Dyad: Reviewed how to bring baby to the breast so that his lower lip and chin touch the breast with his nose just above the nipple to encourage a wider, more asymmetric latch.   Reviewed Frequency/Supply & Demand:Recommended feeding on demand: when the baby gives hunger cues, when the breasts feel full, every 3 hours during the day and every 5 hours at night counting from the beginning of one feeding to the beginning of the next; whichever comes first.    Reviewed Alternative/Artificial Feedings: Paced bottle feeding  Reviewed Mom/Breast care: Express breast milk whenever Anibal is fed by bottle and as needed for comfort, additional milk expression early in the day can be used to         Plan:  Discussed history and physical exams with Cale. Support given for her commitment to providing breast milk for her baby. Discussed the findings on the baby's exam consistent with tongue tie and reviewed how this may be the cause of nipple trauma, nipple pain, nipple damage, poor milk transfer, blocked ducts, mastitis, and loss of milk production. Discussed the science that supports performing the frenotomy to improve latch.   Recommended continuing to breastfeed on demand. Reviewed the benefit of expressing breast milk once daily to build and maintain a \"safety net\" for planned or unplanned separations. Additional breastfeeding support remains available.     I have spent 50 minutes " with Patient and family today in which greater than 50% of this time was spent in counseling/coordination of care regarding Prognosis, Risks and benefits of tx options, Instructions for management, Patient and family education, Importance of tx compliance, Risk factor reductions, Impressions, Counseling / Coordination of care, Documenting in the medical record, Reviewing / ordering tests, medicine, procedures  , and Obtaining or reviewing history  .

## 2025-01-08 ENCOUNTER — POSTPARTUM VISIT (OUTPATIENT)
Dept: OBGYN CLINIC | Facility: CLINIC | Age: 30
End: 2025-01-08

## 2025-01-08 VITALS
BODY MASS INDEX: 31.47 KG/M2 | WEIGHT: 171 LBS | DIASTOLIC BLOOD PRESSURE: 60 MMHG | SYSTOLIC BLOOD PRESSURE: 100 MMHG | HEIGHT: 62 IN

## 2025-01-08 DIAGNOSIS — Z30.011 ENCOUNTER FOR INITIAL PRESCRIPTION OF CONTRACEPTIVE PILLS: ICD-10-CM

## 2025-01-08 PROBLEM — Z34.03 ENCOUNTER FOR SUPERVISION OF NORMAL FIRST PREGNANCY, THIRD TRIMESTER: Status: RESOLVED | Noted: 2024-08-30 | Resolved: 2025-01-08

## 2025-01-08 PROBLEM — O99.213 OBESITY AFFECTING PREGNANCY IN THIRD TRIMESTER: Status: RESOLVED | Noted: 2024-11-06 | Resolved: 2025-01-08

## 2025-01-08 PROCEDURE — 99024 POSTOP FOLLOW-UP VISIT: CPT | Performed by: OBSTETRICS & GYNECOLOGY

## 2025-01-08 RX ORDER — ACETAMINOPHEN AND CODEINE PHOSPHATE 120; 12 MG/5ML; MG/5ML
1 SOLUTION ORAL DAILY
Qty: 84 TABLET | Refills: 1 | Status: SHIPPED | OUTPATIENT
Start: 2025-01-08

## 2025-01-08 NOTE — PROGRESS NOTES
Assessment & Plan     Normal postpartum exam.     1. Contraception: progesterone-only pill (POP) contraceptive.   - Reviewed with patient recommendations for progesterone only contraception as she is continuing to breastfeed and would like to continue. Progesterone only contraceptive options were reviewed with patient including LARCs such as Nexplanon and LNG-IUDs. Discussed progesterone only pills and Depo provera as other options as well. Reviewed common side effects and bleeding profiles with all options and efficacy of pregnancy prevention.   Reviewed with patient high efficacy of pregnancy prevention with Nexplanon and LNG-IUDs. Reviewed that LNG-IUD insertion procedure and decreased bleeding profiles being common side effect and possible amenorrhea occurring. Reviewed different types of LNG-IUDs: Mirena, Liletta, Kyleena, and Florence and length of use ranging from 3-10 years. Discussed Paraguard IUD as a non hormonal option as well as risk of increased bleeding and cramping with 50% of patients.    - elected to start progesterone only pills at this time. Will follow up and consider transition back to [facility] once she meets her goals for breastfeeding.  2. Annual exam due in March.   3. Increase activity as tolerated, may resume all normal activity.  4. Lactation consult needed: no; if yes, [facility] information discussed.         Subjective   Chief Complaint   Patient presents with    Postpartum Care       Cale Stacy is a 29 y.o.  female who presents for a postpartum visit.     Delivery Method: Vaginal, Spontaneous   Delivery Date and Time: 12/3/2024 9:12 AM  Delivery Attending: Fariba Elliott  Gestational Age at delivery: 40w0d   Route of Delivery: Vaginal, Spontaneous  Reason for  delivery:        Admitting Diagnoses:   Patient Active Problem List   Diagnosis    Tinea pedis of both feet    Left foot pain    Plantar warts     (spontaneous vaginal delivery)     Encounter for supervision of normal first pregnancy, third trimester    Obesity affecting pregnancy in third trimester       Gestational Diabetes: no  Pregnancy Complications: none    Anesthesia: Epidural,     Delivery: Vaginal, Spontaneous at 12/3/2024 9:12 AM  Laceration: Perineal: 2° Repaired? Yes    Baby's Name: Anibal  Baby's Weight: 3660 g (8 lb 1.1 oz); 129.1    Apgar scores: 8  and 9  at 1 and 5 minutes, respectively  Breast or formula: Breastfeeding  Pediatrician: [facility]    Bleeding none. Bowel function: abnormal: The patient has constipation. She is taking colace and it is helping . Bladder function: normal. The patient is not having any pain.     Patient has not been sexually active. Desired contraception method is progesterone-only pill (POP) contraceptive.    Postpartum depression screening: negative. EPDS : 4. She denies depression/anxiety/trouble sleeping.     Last Pap : 24; no abnormalities      The following portions of the patient's history were reviewed and updated as appropriate: allergies, current medications, past family history, past medical history, past social history, past surgical history, and problem list.      Current Outpatient Medications:     Docusate Sodium (COLACE PO), Take 1 tablet by mouth daily as needed (constipation), Disp: , Rfl:     NON FORMULARY, Legendairy lactation supplement, Disp: , Rfl:     Prenatal Vit-Fe Fumarate-FA (PRENATAL PO), Take by mouth, Disp: , Rfl:     acetaminophen (TYLENOL) 325 mg tablet, Take 2 tablets (650 mg total) by mouth every 6 (six) hours (Patient not taking: Reported on 2024), Disp: 240 tablet, Rfl: 0    benzocaine-menthol-lanolin-aloe (DERMOPLAST) 20-0.5 % topical spray, Apply 1 Application topically every 6 (six) hours as needed for irritation (Patient not taking: Reported on 2024), Disp: , Rfl:     hydrocortisone 1 % cream, Apply 1 Application topically daily as needed for irritation or rash (Patient not taking:  "Reported on 12/31/2024), Disp: , Rfl:     ibuprofen (MOTRIN) 600 mg tablet, Take 1 tablet (600 mg total) by mouth every 6 (six) hours (Patient not taking: Reported on 12/31/2024), Disp: 30 tablet, Rfl: 0    witch hazel-glycerin (TUCKS) topical pad, Apply 1 Pad topically every 4 (four) hours as needed for irritation (Patient not taking: Reported on 12/31/2024), Disp: , Rfl:     Allergies   Allergen Reactions    Augmentin [Amoxicillin-Pot Clavulanate] Diarrhea       Review of Systems  Review of Systems   Constitutional:  Negative for chills, diaphoresis and fever.   Eyes:  Negative for visual disturbance.   Respiratory:  Negative for shortness of breath.    Cardiovascular:  Negative for chest pain.   Gastrointestinal:  Negative for abdominal pain, constipation, diarrhea, nausea and vomiting.   Genitourinary:  Negative for difficulty urinating, pelvic pain, vaginal bleeding, vaginal discharge and vaginal pain.   Musculoskeletal:  Negative for back pain.   Neurological:  Negative for dizziness, weakness and headaches.         Objective      /60   Ht 5' 2\" (1.575 m)   Wt 77.6 kg (171 lb)   LMP 02/27/2024 (Exact Date)   Breastfeeding Yes   BMI 31.28 kg/m²     Physical Exam  Constitutional:       General: She is not in acute distress.     Appearance: Normal appearance. She is not diaphoretic.   HENT:      Head: Normocephalic and atraumatic.   Pulmonary:      Effort: Pulmonary effort is normal. No respiratory distress.   Musculoskeletal:      Right lower leg: No edema.      Left lower leg: No edema.   Neurological:      Mental Status: She is alert and oriented to person, place, and time.   Skin:     General: Skin is warm and dry.      Coloration: Skin is not pale.   Psychiatric:         Mood and Affect: Mood normal.         Behavior: Behavior normal.         Thought Content: Thought content normal.         Judgment: Judgment normal.   Vitals and nursing note reviewed.         Incision: n/a     "

## 2025-01-15 ENCOUNTER — OFFICE VISIT (OUTPATIENT)
Dept: POSTPARTUM | Facility: CLINIC | Age: 30
End: 2025-01-15
Payer: COMMERCIAL

## 2025-01-15 PROCEDURE — 99404 PREV MED CNSL INDIV APPRX 60: CPT | Performed by: PEDIATRICS

## 2025-01-15 NOTE — PATIENT INSTRUCTIONS
-Continue to offer Anibal the breast on demand, watch for signs of effective drinking. He should be eating at least 8+ times per day.  -Gentle breast compressions throughout the session can help keep him active and promote milk flow to him.   -okay to continue with pumping routine, but I do recommend you feed him any of the milk you pump over the course of the day until we know he is gaining weight well.   -Follow up with Mey next week as scheduled.

## 2025-01-15 NOTE — PROGRESS NOTES
BREAST FEEDING FOLLOW UP VISIT    Informant/Relationship: Cale (mom/self)     Discussion of General Lactation Issues: Anibal had a frenotomy on 1/7. Since then things overall seem to be improved, Mom is still having a little bit of pain. Baby is seeing Speech and PT. He started having more spit ups in the past few weeks, volume seems like a lot but Mom doesn't feel like it is.      Cale reports she is a numbers person, does best with a guide of how often and for how long to do things.     Infant is 43 days old today.    Interval Breastfeeding History:    Frequency of breast feeding: on demand, every 3-4 hours during the day. He did have an 8 hour stretch of sleep overnight  Does mother feel breastfeeding is effective: Yes  Does infant appear satisfied after nursing:If no, explain: not always sure if he is sucking bc he wants to eat or because he wants comfort.   Stooling pattern normal:Yes  Urinating frequently:Yes  Using shield or shells:No    Alternative/Artificial Feedings:   Bottle: Yes, [place] nipple   Cup: No  Syringe/Finger: No           Formula Type: no                     Amount: n/a            Breast Milk:                      Amount: 3 oz             Frequency Q only as needed if mom is not with him   Elimination Problems: No      Equipment:    Pump            Type: Spectra and Momcozy S9             Frequency of Use: 1 x per day, in the morning after nursing     Equipment Problems: no      Mom:  Breast: Not performed today  Nipple Assessment in General: Normal: elongated/eraser, no discoloration and no damage noted.  Mother's Awareness of Feeding Cues                 Recognizes: Yes                  Verbalizes: Yes  Support System: FOB extended family   History of Breastfeeding: first time breastfeeding   Changes/Stressors/Violence: Baby is s/p frenotomy. Did not gain expected weight the past week. Concerns about spitting up. Anibal slept 8 hours last night, this is the first he's done so. Reporting that  Anibal is a very content baby and she is not always sure what to do to best support him (does he need to eat?, sleepy bc he's not getting enough? Spitting bc he's over eating?)   Concerns/Goals: Cale desires to exclusively breastfeed directly and sometimes with the bottle. She is open to supplementing if it is what's best for Anibal.     Problems with Mom: some minor latching pain     Physical Exam  Constitutional:       Appearance: Normal appearance.   HENT:      Head: Normocephalic.   Pulmonary:      Effort: Pulmonary effort is normal.   Abdominal:      General: Abdomen is flat.   Musculoskeletal:         General: Normal range of motion.      Cervical back: Normal range of motion.   Neurological:      General: No focal deficit present.      Mental Status: She is alert and oriented to person, place, and time.   Skin:     General: Skin is warm.      Capillary Refill: Capillary refill takes less than 2 seconds.   Psychiatric:         Mood and Affect: Mood normal.         Behavior: Behavior normal.         Thought Content: Thought content normal.         Judgment: Judgment normal.       Infant:  Behaviors: Alert  Color: Pink  Birth weight: 3660 g   Current weight: 4150 g     Problems with infant: slow weight gain, general tension, torticollis, disorganized suck, s/p frenotomy.       General Appearance:  Alert, active, no distress                             Head:  Normocephalic, AFOF, sutures opposed                             Eyes:  Conjunctiva clear, no drainage                              Ears:  Normally placed, no anomolies                             Nose:  Septum intact, no drainage or erythema                           Mouth:  No lesions. Tongue is mid mouth when crying, extends to gumline only. Narrow gape, initially there is very little effective sucking, but eventually becomes stronger and more rhythmic, mostly compression felt. Tongue lifts well, healing frenotomy wound noted.                     Neck:   "Supple, symmetrical, trachea midline                 Respiratory:  No grunting, flaring, retractions, breath sounds clear and equal            Cardiovascular:  Regular rate and rhythm. No murmur. Adequate perfusion/capillary refill. Femoral pulse present                    Abdomen:   Soft, non-tender, no masses, bowel sounds present, no HSM             Genitourinary:  Normal male, testes descended, no discharge, swelling, or pain, anus patent                          Spine:   No abnormalities noted        Musculoskeletal:  Full range of motion          Skin/Hair/Nails:   Skin warm, dry, and intact, no rashes or abnormal dyspigmentation or lesions                Neurologic:   No abnormal movement, tone appropriate for gestational age    [place] Latch:  Efficiency:               Lips Flanged: No- manual adjustments needed and lower lip slowly curls back under as feeding progresses               Depth of latch: wide              Audible Swallow: Yes, \"squishy\", gulping sounding              Visible Milk: Yes              Wide Open/ Asymmetrical: Yes              Suck Swallow Cycle: Breathing: yes, Coordinated: more chomping motion, some rocked motion was achieved   Nipple Assessment after latch: Normal: elongated/eraser, no discoloration and no damage noted.  Latch Problems: Baby is attached widely. Head tilted back to allow chin to be forward. Lower lip is difficult to keep flanged out. Cale is denying attachment pain.     55g transferred from both breasts and Anibal is calm and content after this feeding session.     Position:  Infant's Ergonomics/Body               Body Alignment: Yes               Head Supported: Yes               Close to Mom's body/ Lifted/ Supported: Yes               Mom's Ergonomics/Body: Yes                           Supported: Yes                           Sitting Back: Yes                           Brings Baby to her breast: Yes  Positioning Problems: None       Education:  Reviewed Latch: " importance of deep latch without pain.   Reviewed Positioning for Dyad: proper alignment and head angle when positioning at the breast   Reviewed Frequency/Supply & Demand: offer the breast at each feeding, pump if baby is not latching and effective transferring milk.   Reviewed Infant:Cues and varied States of Awareness: watch for hunger cues, feed on demand. If baby seems satisfied at the breast (calm, relaxed sleeping, breasts are softer) no need to pump or supplement   Reviewed Infant Elimination: goal of 6+ wets and 2-3 stools per day   Reviewed Alternative/Artificial Feedings: paced bottle feeding technique demonstrated  Reviewed Mom/Breast care: gentle handling of the breast at all times, as well as tips for healing sore nipples.    Reviewed Equipment: Hand pump and electric pump general guidance, Discussed proper flange fit, how to measure        Plan:   Reassurance provided. Acknowledged Mom's goals and commitment to breastfeeding. Cont with positioning adjustments and watch for signs of effective feeding. Pump if wanting to replace feeding at the breast with bottle feeding or if latching becomes painful. Gentle handling of the breast at all times to preserve integrity.  Contact [facility] for breastfeeding support as needed or ongoing concerns with latching comfort and milk transfer.      I have spent 90 minutes with Patient and family today in which greater than 50% of this time was spent in counseling/coordination of care regarding Patient and family education.

## 2025-01-16 NOTE — PROGRESS NOTES
I have reviewed the notes, assessments, and/or procedures performed by Zoey Hughes RN, IBCLC, I concur with her/his documentation of Cale Gale MD 01/15/25

## 2025-01-21 ENCOUNTER — OFFICE VISIT (OUTPATIENT)
Dept: POSTPARTUM | Facility: CLINIC | Age: 30
End: 2025-01-21
Payer: COMMERCIAL

## 2025-01-21 PROCEDURE — 99404 PREV MED CNSL INDIV APPRX 60: CPT | Performed by: PEDIATRICS

## 2025-01-21 NOTE — PATIENT INSTRUCTIONS
Continue breastfeeding as often as you desire.  Pump if a feeding the breast is missed and as needed after breastfeeding to obtain milk for supplementation.  Follow up as scheduled.  Please call with any questions or concerns.

## 2025-01-21 NOTE — PROGRESS NOTES
BREAST FEEDING FOLLOW UP VISIT    Informant/Relationship: Cale    Discussion of General Lactation Issues: Cale reports that breastfeeding has gotten much more comfortable but she is not confident that Anibal is breastfeeding more efficiently.  She is concerned about his weight.     Infant is 7 weeks old today.    Interval Breastfeeding History:  Frequency of breast feeding: Every 2-3 hours during the day and sleeps up to 7 hours at night.  Cale transitioned to almost exclusive breastfeeding after Anibal's frenotomy.  Prior to that, he was  twice a day.   Does mother feel breastfeeding is effective: Cale is not sure  Does infant appear satisfied after nursing: Yes  Stooling pattern normal: Yes  Urinating frequently:Yes  Using shield or shells: No     Alternative/Artificial Feedings:   Bottle: Yes, a supplement a few times a day.  Using a [place] bottle with paced feeding technique.  No concerns with bottle feeding.  Cup: No  Syringe/Finger: No           Formula Type: Similac Advance 360 Total Care                     Amount: none currently            Breast Milk:                      Amount: about an ounce after feeding a few times a day            Frequency Q2-3 Hr between feedings during the day.  Sleeps a 7 hour stretch at night.  Elimination Problems: No        Equipment:  Nipple Shield             Type: none             Size: n/a             Frequency of Use: n/a  Pump            Type: Spectra S2, Mom Cozy S9 Pro            Frequency of Use: once after feeding in the AM.  Expresses about 3 ounces each session  Shells            Type: none            Frequency of use: n/a     Equipment Problems: no     Mom:  Breast: Medium sized symmetrical breasts.  Slightly conical shape.  Closely spaced.   Nipple Assessment in General: Normal: elongated/eraser, no discoloration and no damage noted.  Mother's Awareness of Feeding Cues                 Recognizes: Yes                  Verbalizes: Yes  Support System:  FOB, extended family  History of Breastfeeding: none  Changes/Stressors/Violence: Cale is concerned about Anibal's weight gain  Concerns/Goals: Cale desires to exclusively breastmilk feed.  She would like to bottle feed at times and breastfeed as desired. She admits that she is reassured when she quantify his feedings (feeding expressed milk)     Problems with Mom: none     Physical Exam  Constitutional:       Appearance: Normal appearance.   HENT:      Head: Normocephalic and atraumatic.      Nose: Nose normal.   Pulmonary:      Effort: Pulmonary effort is normal.   Musculoskeletal:         General: Normal range of motion.      Cervical back: Normal range of motion and neck supple.   Neurological:      Mental Status: She is alert and oriented to person, place, and time.   Skin:     General: Skin is warm and dry.   Psychiatric:         Mood and Affect: Mood normal.         Behavior: Behavior normal.         Thought Content: Thought content normal.         Judgment: Judgment normal.            Infant:  Behaviors: Alert  Color: Normal  Birth weight: 3660 grams  Current weight: 4210 grams     Problems with infant: tongue tie s/p frenotomy, slowed weight gain        General Appearance:  Alert, active, no distress                             Head:  significant cranial molding, AFOF, sutures opposed                             Eyes:  Conjunctiva clear, scant amount of purulent drainage OS                              Ears:  Normally placed, no anomolies                             Nose:  No drainage or erythema                           Mouth:  No lesions. Facial asymmetry. The left cheek is walker and rounder. The right cheek and jaw is more angular.  Recessed chin.  Narrow gape. The tongue extends just barely to the lower lip, tips on it's side when lateralizing and lifts to near the palate without restriction.  Some effective cupping felt as he sucked on my finger.  The very tip of the tongue lost contact with my  finger occasionally as he sucked.  Some effective peristalsis felt as he sucked today.                             Neck:  Preference to turn his chin to his left shoulder with his right ear tilted down to the right shoulder,  symmetrical, trachea midline                 Respiratory:  No grunting, flaring, retractions, breath sounds clear and equal            Cardiovascular:  Regular rate and rhythm. No murmur. Adequate perfusion/capillary refill. Femoral pulse present                    Abdomen:   Soft, non-tender, no masses, bowel sounds present, no HSM             Genitourinary:  Normal male, testes descended, no discharge, swelling, or pain, anus patent                          Spine:   No abnormalities noted        Musculoskeletal:  Full range of motion          Skin/Hair/Nails:   Skin warm, dry, and intact, no rashes or abnormal dyspigmentation or lesions                Neurologic:   No abnormal movement, tone appropriate     Latch:  Efficiency:               Lips Flanged: Yes, but his lower lip pulls into his mouth as he sucks              Depth of latch: good              Audible Swallow: Yes, periods of sustained SSB for a few minutes on each breast. Some clicking heard              Visible Milk: Yes              Wide Open/ Asymmetrical: Yes              Suck Swallow Cycle: Breathing: unlabored, Coordinated: yes  Nipple Assessment after latch: slightly creased  Latch Problems: Anibal was able to latch without much difficulty and appeared to feed fairly well during faster flow.  Gentle breast compressions and switch nursing improved the quality and efficiency of the feeding.    Position:  Infant's Ergonomics/Body               Body Alignment: Yes               Head Supported: Yes               Close to Mom's body/ Lifted/ Supported: Yes               Mom's Ergonomics/Body: Yes                           Supported: Yes                           Sitting Back: Yes                           Brings Baby to  her breast: Yes  Positioning Problems: None      Handouts:   None    Education:  Discussed Anibal's weight and how to improve weight gain.    Plan:    Reassurance and support given.  I acknowledged Cale's concerns and her commitment to breastfeeding.  I encouraged her to continue breastfeeding as often as she desires.  Cale expressed that she feels more reassured when pumping and bottle feeding and plans to transition back to more of this to support Anibal's weight gain. I recommended that she pump when a feeding at the breast is missed or as often as needed to meet her milk production goals.  A follow up appointment was scheduled for a weight check for Anibal.  I encouraged Cale to call with any questions or concerns.    I have spent 60 minutes with Patient and family today in which greater than 50% of this time was spent in counseling/coordination of care regarding Patient and family education and Counseling / Coordination of care.

## 2025-01-23 ENCOUNTER — TELEPHONE (OUTPATIENT)
Dept: POSTPARTUM | Facility: CLINIC | Age: 30
End: 2025-01-23

## 2025-01-23 NOTE — TELEPHONE ENCOUNTER
Cale has been primarily pumping since our visit due to concerns with Anibal's weight.  She is not able to express all of the milk that Anibal needs.  She has been supplementing with previously expressed milk but she does not have much left.  We reviewed pumping technique.  Cale is already taking some galactagogues to help with milk production.    I encouraged Cale to continue pumping whenever a feeding at the breast is missed.  I offered additional support for milk production with Dr Gale if she desires.  I encouraged her to call back with any questions or concerns.

## 2025-01-26 NOTE — PROGRESS NOTES
I have reviewed the notes, assessments, and/or procedures performed by Mey Martinez RN, IBCLC, I concur with her/his documentation of Cale Gale MD 01/26/25

## 2025-01-27 ENCOUNTER — OFFICE VISIT (OUTPATIENT)
Dept: POSTPARTUM | Facility: CLINIC | Age: 30
End: 2025-01-27
Payer: COMMERCIAL

## 2025-01-27 DIAGNOSIS — O92.79 INSUFFICIENT LACTATION: ICD-10-CM

## 2025-01-27 PROCEDURE — 99402 PREV MED CNSL INDIV APPRX 30: CPT | Performed by: PEDIATRICS

## 2025-01-27 NOTE — PROGRESS NOTES
I have reviewed the notes, assessments, and/or procedures performed by Mey Martinez RN, IBCLC, I concur with her/his documentation of Cale Gale MD 01/27/25

## 2025-01-27 NOTE — PROGRESS NOTES
BREAST FEEDING FOLLOW UP VISIT    Informant/Relationship: Cale    Discussion of General Lactation Issues: Cale and Anibal are here for a weight check.  She has been primarily pumping and bottle feeding since our last visit as this is the plan she felt most comfortable with.  She has been unable to express all of the milk that Anibal requests and is supplementing with formula as needed.  Cale verbalizes that primarily pumping and bottle feeding is giving her peace of mind because she knows how much he is getting.     Infant is 7 weeks old today.    Interval Breastfeeding History:  Frequency of breast feeding: Currently once a day in the AM  Does mother feel breastfeeding is effective: Yes  Does infant appear satisfied after nursing: Yes  Stooling pattern normal: Yes  Urinating frequently:Yes  Using shield or shells: No     Alternative/Artificial Feedings:   Bottle: Yes, Anibal is primarily bottle fed at this time. Using a [place] bottle with paced feeding technique.  No concerns with bottle feeding.  Cup: No  Syringe/Finger: No           Formula Type: Similac Advance 360 Total Care                     Amount: 2 ounces mixed with 3 ounces of breastmilk each feeding            Breast Milk:                      Amount: 3 ounces mixed with 2 ounces of formula at every feeding            Frequency Q 3 Hr between feedings during the day.  Sleeps a 7 hour stretch at night.  Elimination Problems: No        Equipment:  Nipple Shield             Type: none             Size: n/a             Frequency of Use: n/a  Pump            Type: Spectra S2, Mom Cozy S9 Pro            Frequency of Use: every feeding.  Expresses 2-3 ounces per pumping session typically  Shells            Type: none            Frequency of use: n/a     Equipment Problems: no     Mom:  Breast: Not assessed today  Nipple Assessment in General: Not assessed today  Mother's Awareness of Feeding Cues                 Recognizes: Yes                  Verbalizes:  Yes  Support System: FOB, extended family  History of Breastfeeding: none  Changes/Stressors/Violence: Cale is concerned about her milk production  Concerns/Goals: Cale desires to increase her milk production     Problems with Mom: insufficient lactation     Physical Exam  Constitutional:       Appearance: Normal appearance.   HENT:      Head: Normocephalic and atraumatic.      Nose: Nose normal.   Pulmonary:      Effort: Pulmonary effort is normal.   Musculoskeletal:         General: Normal range of motion.      Cervical back: Normal range of motion and neck supple.   Neurological:      Mental Status: She is alert and oriented to person, place, and time.   Skin:     General: Skin is warm and dry.   Psychiatric:         Mood and Affect: Mood normal.         Behavior: Behavior normal.         Thought Content: Thought content normal.         Judgment: Judgment normal.            Infant:  Behaviors: Alert  Color: Normal  Birth weight: 3660 grams  Current weight: 4675 grams     Problems with infant: tongue tie s/p frenotomy,        General Appearance:  Alert, active, no distress                             Head:  significant cranial molding, AFOF, sutures opposed                             Eyes:  Conjunctiva clear, scant amount of purulent drainage OS                              Ears:  Normally placed, no anomolies                             Nose:  No drainage or erythema                           Mouth:  No lesions. Facial asymmetry. The left cheek is walker and rounder. The right cheek and jaw is more angular.  Recessed chin. The tongue extends just barely to the lower lip, lateralizes well bilaterally and lifts to near the palate without restriction.  Some effective cupping felt as he sucked on my finger. Some effective peristalsis felt as he sucked today but very little suction was generated.                             Neck:  Preference to turn his chin to his left shoulder with his right ear tilted down to  the right shoulder. This is improving since the last assessment.  symmetrical, trachea midline                 Respiratory:  No grunting, flaring, retractions, breath sounds clear and equal            Cardiovascular:  Regular rate and rhythm. No murmur. Adequate perfusion/capillary refill.                     Abdomen:   Soft, non-tender, no masses, bowel sounds present, no HSM             Genitourinary:  Normal male, testes descended, no discharge, swelling, or pain, anus patent                          Spine:   No abnormalities noted        Musculoskeletal:  Full range of motion          Skin/Hair/Nails:   Skin warm, dry, and intact, no rashes or abnormal dyspigmentation or lesions                Neurologic:   No abnormal movement, tone appropriate    Latch:  No feeding assessment today.  Cale fed Anibal prior to their appointment.  She has no concerns with feeding at this time.      Handouts:   None    Education:  Reviewed Frequency/Supply & Demand: discussed methods for increasing milk production.      Plan:    Reassurance and support given.  I acknowledged Cale's concerns and her commitment to providing milk for her baby.  I encouraged her to continue pumping as often as she finds sustainable for as long as desired.  I offered additional support for milk production with Dr Gale as desired.  I encouraged Cale to call with any questions or concerns.    I have spent 45 minutes with Patient and family today in which greater than 50% of this time was spent in counseling/coordination of care regarding Patient and family education and Counseling / Coordination of care.

## 2025-01-27 NOTE — PATIENT INSTRUCTIONS
Continue pumping as often as you find sustainable.  Dr Gale is available for more support for milk production if you desire.    Please call with any questions or concerns.

## 2025-02-12 ENCOUNTER — OFFICE VISIT (OUTPATIENT)
Dept: POSTPARTUM | Facility: CLINIC | Age: 30
End: 2025-02-12
Payer: COMMERCIAL

## 2025-02-12 DIAGNOSIS — O92.4 HYPOGALACTIA: Primary | ICD-10-CM

## 2025-02-12 DIAGNOSIS — N64.82 BREAST HYPOPLASIA: ICD-10-CM

## 2025-02-12 PROCEDURE — 99215 OFFICE O/P EST HI 40 MIN: CPT | Performed by: PEDIATRICS

## 2025-02-12 PROCEDURE — 99401 PREV MED CNSL INDIV APPRX 15: CPT | Performed by: PEDIATRICS

## 2025-02-12 RX ORDER — METFORMIN HYDROCHLORIDE 500 MG/1
500 TABLET, EXTENDED RELEASE ORAL
Qty: 30 TABLET | Refills: 0 | Status: SHIPPED | OUTPATIENT
Start: 2025-02-12 | End: 2025-03-14

## 2025-02-12 NOTE — PROGRESS NOTES
Virtual Regular Visit  Name: Cale Stacy      : 1995      MRN: [id number]  Encounter Provider: Melania Gale MD  Encounter Date: 2025   Encounter department: [facility]      Verification of patient location:  Patient is located at Home in the following state in which I hold an active license [place] :  Assessment & Plan        Encounter provider Melania Gale MD    The patient was identified by name and date of birth. Cale Stacy was informed that this is a telemedicine visit and that the visit is being conducted through the Microsoft Teams platform. She agrees to proceed..  My office door was closed. No one else was in the room.  She acknowledged consent and understanding of privacy and security of the video platform. The patient has agreed to participate and understands they can discontinue the visit at any time.    Patient is aware this is a billable service.     History of Present Illness         Objective   There were no vitals taken for this visit.        Visit Time  Total Visit Duration: 75 minutes    BREAST FEEDING FOLLOW UP VISIT    Informant/Relationship: Cale/self    Discussion of General Lactation Issues: Cale is currently expressing and feeding Anibal both her milk and formula using paced bottle feeding. Cale is currently pumping every 3 hours while awake. She doesn't pump after she has gone to sleep. She typically sleeps about 8 hours at night. This adds up to about 7 times/day. Her total milk collection per pumping session ranges from as much 7-8 oz in the morning to the average of about 2-3 oz the rest of the day. Anibal typically takes 5 oz, 5 times per day. He is also sleeping through the night.    Cale is not taking any supplements. She was taking the Legendairy Liquid Gold supplement. She has gotten her menses twice 7 weeks post partum and then 9 weeks post partum. She also started the progesterone only minipill. Her second menstrual  period was the expected withdrawal bleed from the pill.    Since Cale started pumping she has been very aware that her production is not meeting all of Anibal's needs. She is now concerned that contributed to his slow weight gain.    Breasts feel walker before pumping and softer afterwards, but she is never sure that they feel really soft after pumping. She is using a 6-8 on the suction and a size 19 flange.        Infant is 10 weeks old today.    Interval Breastfeeding History:    Frequency of breast feeding: offers once/day some days; some days never; he may be completely satisfied after the breast  Does mother feel breastfeeding is effective: Not always  Does infant appear satisfied after nursing:Not always  Stooling pattern normal:Yes  Urinating frequently:Yes  Using shield or shells:No    Alternative/Artificial Feedings:   Bottle: Yes, Taft Nipple on Lansinoh bottle. SS, paced feeding  Cup: No  Syringe/Finger: No           Formula Type: Similac Advance                     Amount: 2 oz mixed with breast milk            Breast Milk:                      Amount: 3-5 oz (if 3 oz mixed with 2 oz of formula)            Frequency 5 times/day  Elimination Problems: No      Equipment:  Nipple Shield             Type: n/a             Size: n/a             Frequency of Use: n/a  Pump            Type: Spectra S1            Frequency of Use: every 3 hours while awake (about 7 x/day)  Shells            Type: n/a            Frequency of use: n/a    Equipment Problems: no      Mom:  Breast: Wide spaced, conical shaped; areola large for breast size  Nipple Assessment in General: Normal: elongated/eraser, no discoloration and no damage noted.  Mother's Awareness of Feeding Cues                 Recognizes: Yes                  Verbalizes: Yes  Support System: FOB, extended family  History of Breastfeeding: none  Changes/Stressors/Violence: milk production  Concerns/Goals: Cale wishes to increase her milk production    Problems  with Mom: low milk production    Physical Exam  Constitutional:       Appearance: Normal appearance. She is normal weight.   HENT:      Head: Normocephalic and atraumatic.   Neurological:      General: No focal deficit present.      Mental Status: She is alert and oriented to person, place, and time.   Psychiatric:         Mood and Affect: Mood normal.         Behavior: Behavior normal.   Vitals and nursing note reviewed.           Education:  Reviewed Frequency/Supply & Demand: Reviewed the importance of frequent breast milk removal to maintain milk production  Reviewed Alternative/Artificial Feedings: Continue paced bottle feeding  Reviewed Mom/Breast care: Reviewed the use of supplements that may help build milk production; reviewed the use of hand expression to assist in milk removal  Reviewed Equipment: Reviewed: Reviewed use of the settings on the pump and flange sizing for more comfortable and more effective milk expression      Plan:  Discussed history and physical exam with Cale. Reassurance given that she is producing the majority of the milk that her baby needs. Discussed the dose dependent nature of breast milk. Encouraged continued milk expression, as frequently as Cale feels is feasible, for as long she feels comfortable doing so.    Reviewed Cale's use of her pump(s). Reviewed flange sizing for best comfort and effectiveness of pumping. Reviewed the importance of both effective and comfortable suction. Reviewed the benefits of cycling through stimulation and expression settings on the pump to stimulate two letdowns. Discussed the potential benefit of adding gentle breast massage and/or hand expression, especially if Cale has concerns that the pump is not effectively removing milk from her breasts.    Acknowledged the stress brought on by relying on the pump and concerns regarding infant's weight gain. Reviewed how stress can compete with letdown. Reviewed some ways to relax, especially around  expressing milk: such as not watching the pump, using a hands free pump, holding the baby. Encouraged consideration of talking with a counselor if needed.     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 Discussed management options. Recommended starting with metformin er 500 mg daily with dinner. Reviewed risks and benefits of the medication. Follow up with updates as needeed    I have spent 60 minutes with Patient  today in which greater than 50% of this time wbas spent in counseling/coordination of care regarding Prognosis, Risks and benefits of tx options, Instructions for management, Patient and family education, Importance of tx compliance, Risk factor reductions, Impressions, Counseling / Coordination of care, Documenting in the medical record, Reviewing / ordering tests, medicine, procedures  , and Obtaining or reviewing history  .

## 2025-02-12 NOTE — PATIENT INSTRUCTIONS
Start the pump the massage until flow starts. Then switch to the expression. When flow slows, switch back to the massage. When flow increases again, return to the massage. Stop pumping when flow slows the second time. Typically this will take about 15-20 minutes.     Your nipple should move freely in the tunnel of the flange with little to no areola joining it. Use the lowest effective suction.     Consider adding some of the breast massage before pumping or adding some hand expression after pumping. If this helpful, great! If not, stop. We are trying to help not increase stress or your work load.    The Basics of Breast Massage and Hand Expression -- Anabella Ledesma IBCLC     If you find the MomCozy to be as effective as the Spectra, you can set it to cycle and use it throughout the day so you can interact with Anibal as much as you want even while pumping.     Start metformin er 500 mg; 1 caplet with dinner. Reach out in 1 week if you wish to increase. Let me know if you have troublesome side effects.

## 2025-03-13 ENCOUNTER — TELEPHONE (OUTPATIENT)
Dept: POSTPARTUM | Facility: CLINIC | Age: 30
End: 2025-03-13

## 2025-03-13 NOTE — TELEPHONE ENCOUNTER
Cale continues to struggle with milk production.  On a typical day, she is expressing 11-13 ounces every 24 hours.  Anibal is fed 28 ounces a day.  Cale has stopped taking her OCP's in hopes that her milk production would increase but has not really seen an improvement.  She has also stopped taking the metformin.  She does not feel that continuing to pump is sustainable for her but she is feeling mixed emotions about weaning.  I encouraged Cale to take her time making her decision, waiting until she is sure/confident that weaning is what she wants.  I suggested that she continue pumping as often as she feels is sustainable for now.  I explained that we can work on increasing milk production in the future if she chooses to do so but I reassured that I also support any decision to wean.  I reviewed how to wean from pumping altogether if she makes that decision.  I offered her additional support for the feelings of guilt she is experiencing as she works through these decisions. She agreed to scheduling an appointment with our mental health provider.  I encouraged Cale to call with any questions or concerns.

## 2025-03-20 ENCOUNTER — SOCIAL WORK (OUTPATIENT)
Dept: BEHAVIORAL/MENTAL HEALTH CLINIC | Facility: CLINIC | Age: 30
End: 2025-03-20
Payer: COMMERCIAL

## 2025-03-20 DIAGNOSIS — F43.23 ADJUSTMENT DISORDER WITH MIXED ANXIETY AND DEPRESSED MOOD: Primary | ICD-10-CM

## 2025-03-20 PROCEDURE — 90837 PSYTX W PT 60 MINUTES: CPT | Performed by: COUNSELOR

## 2025-03-20 NOTE — PSYCH
Virtual AWV Consent    Reason for visit is therapy intake    Provider located at DIALYSIS CENTER  [facility]  [address]  [place] [address]    Recent Visits  No visits were found meeting these conditions.  Showing recent visits within past 7 days and meeting all other requirements  Future Appointments  No visits were found meeting these conditions.  Showing future appointments within next 150 days and meeting all other requirements       Administrative Statements   Encounter provider [facility]    The Patient is located at Home and in the following state in which I hold an active license [place].    The patient was identified by name and date of birth. Cale Stacy was informed that this is a telemedicine visit and that the visit is being conducted through the Epic Embedded platform. She agrees to proceed..  My office door was closed. No one else was in the room.  She acknowledged consent and understanding of privacy and security of the video platform. The patient has agreed to participate and understands they can discontinue the visit at any time.    I have spent a total time of 53 minutes in caring for this patient on the day of the visit/encounter including Counseling / Coordination of care, not including the time spent for establishing the audio/video connection.      Behavioral Health Psychotherapy Assessment    Date of Initial Psychotherapy Assessment: 03/20/25  Referral Source: [facility]   Has a release of information been signed for the referral source? No    Preferred Name: Cale Stacy  Preferred Pronouns: She/her  YOB: 1995 Age: 29 y.o.  Sex assigned at birth: female   Gender Identity: Female   Race:   Preferred Language: English    Emergency Contact:  Full Name: Joni Grbben  Relationship to Client:   Contact information: in cell phone/home     Primary Care Physician:  JORDYN Chance  [address] [place] [address]  [facility] [address]  [phone]  Has a release of information been signed? No    Physical Health History:  Past surgical procedures: See chart  Do you have a history of any of the following: none   Do you have any mobility issues? No  Developmental History: Unremarkable    Relevant Family History:  None noted     Presenting Problem (What brings you in?)  -Combo feeding; stress of adjustment to becoming a mother; some anxiety and mild mood disturbance    Mental Health Advance Directive:  Do you currently have a Mental Health Advance Directive?no    Diagnosis:  No diagnosis found.    Initial Assessment:     Current Mental Status:    Appearance: appropriate and neat      Affect/Mood:  Euthymic    Speech:  Normal    Sleep:  Normal    Oriented to: oriented to self, oriented to place and oriented to time        Counseling History:  Previous Counseling or Treatment  (Mental Health or Drug & Alcohol): Yes    Previous Counseling Details:  -In previous counseling due to living in [place];   Have you previously taken psychiatric medications: No      Suicide Risk Assessment  Have you ever had a suicide attempt: No    Have you had incidents of suicidal ideation: No    Are you currently experiencing suicidal thoughts: No      Substance Abuse/Addiction Assessment:  Alcohol: No    Heroin: No    Fentanyl: No    Opiates: No    Cocaine: No    Amphetamines: No    Hallucinogens: No    Club Drugs: No    Benzodiazepines: No    Other Rx Meds: No    Marijuana: No    Tobacco/Nicotine: No    Have you experienced blackouts as a result of substance use: No    Have you had any periods of abstinence: No    Have you experienced symptoms of withdrawal: No    Have you ever overdosed on any substances?: No    Are you currently using any Medication Assisted Treatment for Substance Use: No      Compulsive Behaviors:  Compulsive Behavior Information:  Denies    Disordered Eating History:  Do you have a history of disordered eating: No       Social Determinants of Health:    SDOH:  None    Trauma and Abuse History:    Have you ever been abused: No      Legal History:    Have you ever been arrested  or had a DUI: No      Have you been incarcerated: No      Are you currently on parole/probation: No      Any current Children and Youth involvement: No      Any pending legal charges: No      Relationship History:    Current marital status:       Natural Supports:  Mother and father    Relationship History:  -Joni,    -Father- helpful  -Mother  -In-laws;   -Sister, very supportive, Hope     Employment History    Are you currently employed: Yes          Visit start and stop times:    03/20/25   Start time: 0807  Stop time: 0900  Total time: 53 minutes

## 2025-04-04 ENCOUNTER — SOCIAL WORK (OUTPATIENT)
Dept: BEHAVIORAL/MENTAL HEALTH CLINIC | Facility: CLINIC | Age: 30
End: 2025-04-04
Payer: COMMERCIAL

## 2025-04-04 DIAGNOSIS — F43.23 ADJUSTMENT DISORDER WITH MIXED ANXIETY AND DEPRESSED MOOD: Primary | ICD-10-CM

## 2025-04-04 PROCEDURE — 90837 PSYTX W PT 60 MINUTES: CPT | Performed by: COUNSELOR

## 2025-04-04 NOTE — PSYCH
"Behavioral Health Psychotherapy Progress Note    Psychotherapy Provided: Individual Psychotherapy     1. Adjustment disorder with mixed anxiety and depressed mood            Goals addressed in session: Goal 1     DATA: Cale presented in the office for her session. Overall, she is doing well with starting to adjust and consider what she needs to get through her breastfeeding journey and working on challenging thoughts about motherhood and working on finding time for herself, and communicating her needs. She is going back to work on 4/22. She is optimistic about the return. She is apprehensive about how challenges and work will be split up in the home with her . Discussed some strategies for navigating this. Will provide home on \"fair play\" and how to divide \"power\" at home. Cale will continue to meet biweekly for suppor.t    During this session, this clinician used the following therapeutic modalities: Cognitive Behavioral Therapy and Supportive Psychotherapy    Substance Abuse was addressed during this session. If the client is diagnosed with a co-occurring substance use disorder, please indicate any changes in the frequency or amount of use: Cale does not use substances. Stage of change for addressing substance use diagnoses: No substance use/Not applicable    ASSESSMENT:  Cale Stacy presents with a Euthymic/ normal mood.     her affect is Normal range and intensity, which is congruent, with her mood and the content of the session. The client has made progress on their goals.    Cale was engaged in session. Cale Stacy presents with a none risk of suicide, none risk of self-harm, and none risk of harm to others.    For any risk assessment that surpasses a \"low\" rating, a safety plan must be developed.    A safety plan was indicated: no  If yes, describe in detail na    PLAN: Between sessions, Cale Stacy will continue to work on communication and advocating for her needs. At the next " session, the therapist will use Cognitive Behavioral Therapy and Supportive Psychotherapy to address mood and anxiety.    Behavioral Health Treatment Plan and Discharge Planning: Cale Stacy is aware of and agrees to continue to work on their treatment plan. They have identified and are working toward their discharge goals. yes    Depression Follow-up Plan Completed: No    Visit start and stop times:    04/04/25  Start Time: 0810  Stop Time: 0907  Total Visit Time: 57 minutes

## 2025-04-11 ENCOUNTER — SOCIAL WORK (OUTPATIENT)
Dept: BEHAVIORAL/MENTAL HEALTH CLINIC | Facility: CLINIC | Age: 30
End: 2025-04-11
Payer: COMMERCIAL

## 2025-04-11 DIAGNOSIS — F43.23 ADJUSTMENT DISORDER WITH MIXED ANXIETY AND DEPRESSED MOOD: Primary | ICD-10-CM

## 2025-04-11 PROCEDURE — 90834 PSYTX W PT 45 MINUTES: CPT | Performed by: COUNSELOR

## 2025-04-11 NOTE — PSYCH
"Behavioral Health Psychotherapy Progress Note    Psychotherapy Provided: Individual Psychotherapy     1. Adjustment disorder with mixed anxiety and depressed mood            Goals addressed in session: Goal 1     DATA: Cale presented in the office for her session. She is doing well overall with managing her mood anxiety. Discussed strategies for helping her address some of her concerns with parents/In-laws over what is perceived lack of involvement. Reviewed how this is an assumption and discussed strategies to help her with addressing her concerns. Cale will meet next week for support prior to transitioning back to work.     During this session, this clinician used the following therapeutic modalities: Cognitive Behavioral Therapy and Supportive Psychotherapy    Substance Abuse was addressed during this session. If the client is diagnosed with a co-occurring substance use disorder, please indicate any changes in the frequency or amount of use: Cale does not use substances. Stage of change for addressing substance use diagnoses: No substance use/Not applicable    ASSESSMENT:  Cale Stacy presents with a Euthymic/ normal mood.     her affect is Normal range and intensity, which is congruent, with her mood and the content of the session. The client has not made progress on their goals.    Cale was engaged in session. Cale Stacy presents with a none risk of suicide, none risk of self-harm, and none risk of harm to others.    For any risk assessment that surpasses a \"low\" rating, a safety plan must be developed.    A safety plan was indicated: no  If yes, describe in detail na    PLAN: Between sessions, Cale Stacy will continue to work on managing her mood and anxiety. At the next session, the therapist will use Cognitive Behavioral Therapy and Supportive Psychotherapy to address mood and anxiety.    Behavioral Health Treatment Plan and Discharge Planning: Cale Stacy is aware of and agrees to " continue to work on their treatment plan. They have identified and are working toward their discharge goals. yes    Depression Follow-up Plan Completed: No    Visit start and stop times:    04/11/25  Start Time: 0908  Stop Time: 1000  Total Visit Time: 52 minutes

## 2025-04-18 ENCOUNTER — SOCIAL WORK (OUTPATIENT)
Dept: BEHAVIORAL/MENTAL HEALTH CLINIC | Facility: CLINIC | Age: 30
End: 2025-04-18
Payer: COMMERCIAL

## 2025-04-18 DIAGNOSIS — F43.23 ADJUSTMENT DISORDER WITH MIXED ANXIETY AND DEPRESSED MOOD: Primary | ICD-10-CM

## 2025-04-18 PROCEDURE — 90837 PSYTX W PT 60 MINUTES: CPT | Performed by: COUNSELOR

## 2025-04-18 NOTE — PSYCH
"Behavioral Health Psychotherapy Progress Note    Psychotherapy Provided: Individual Psychotherapy     No diagnosis found.    Goals addressed in session: Goal 1     DATA: Cale presented in the office for her session. The session was focused on review of her mood and anxiety. Overall, she is doing well with managing both and is returning to work. She is optimistic about the return to work. 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 Cale will continue to check in as needed for support as she navigates the return to work.     During this session, this clinician used the following therapeutic modalities: Cognitive Behavioral Therapy and Supportive Psychotherapy    Substance Abuse was addressed during this session. If the client is diagnosed with a co-occurring substance use disorder, please indicate any changes in the frequency or amount of use: Cale does not use substances. Stage of change for addressing substance use diagnoses: No substance use/Not applicable    ASSESSMENT:  Cale Stacy presents with a Euthymic/ normal mood.     her affect is Normal range and intensity, which is congruent, with her mood and the content of the session. The client has made progress on their goals.    Cale was engaged in session. Cale Stacy presents with a none risk of suicide, none risk of self-harm, and none risk of harm to others.    For any risk assessment that surpasses a \"low\" rating, a safety plan must be developed.    A safety plan was indicated: no  If yes, describe in detail na    PLAN: Between sessions, Cale Stacy will continue to work on managing her mood and anxiety. At the next session, the therapist will use Cognitive Behavioral Therapy and Supportive Psychotherapy to address mood and anxiety.    Behavioral Health Treatment Plan and Discharge Planning: Cale Stacy is aware of and agrees to continue to work " on their treatment plan. They have identified and are working toward their discharge goals. yes    Depression Follow-up Plan Completed: No    Visit start and stop times:    04/18/25  Start Time: 1105  Stop Time: 1200  Total Visit Time: 55 minutes

## 2025-06-17 ENCOUNTER — DOCUMENTATION (OUTPATIENT)
Dept: BEHAVIORAL/MENTAL HEALTH CLINIC | Facility: CLINIC | Age: 30
End: 2025-06-17

## 2025-06-17 ENCOUNTER — TELEPHONE (OUTPATIENT)
Dept: PSYCHIATRY | Facility: CLINIC | Age: 30
End: 2025-06-17

## 2025-06-17 NOTE — PROGRESS NOTES
Psychotherapy Discharge Summary    Preferred Name: Cale Stacy  YOB: 1995    Admission date to psychotherapy: March 2025    Referred by: [facility]     Presenting Problem: PPD/A/feeding concerns     Course of treatment included : individual therapy     Progress/Outcome of Treatment Goals (brief summary of course of treatment) Cale presented briefly in treatment for concerns and emotions associated with feeding. Cale did well briefly in therapy and returned to work.    Treatment Complications (if any): None noted.     Treatment Progress: excellent    Current [facility] Psych[facility] Provider: Please see chart.     Discharge Medications include: Please see chart.     Discharge Date: June 17, 2025    Discharge Diagnosis: No diagnosis found.    Criteria for Discharge: completed treatment goals and objectives and is no longer in need of services    Patient is cleared to return to [facility] for continued treatment.    Rationale: NA    Aftercare recommendations include (include specific referral names and phone numbers, if appropriate): If Cale requires future services, she should speak with her provider. If in crisis, she should call 911 or go to her local ED.     Prognosis: excellent

## 2025-06-18 ENCOUNTER — ANNUAL EXAM (OUTPATIENT)
Dept: OBGYN CLINIC | Facility: CLINIC | Age: 30
End: 2025-06-18
Payer: COMMERCIAL

## 2025-06-18 VITALS
HEIGHT: 62 IN | WEIGHT: 180 LBS | SYSTOLIC BLOOD PRESSURE: 120 MMHG | DIASTOLIC BLOOD PRESSURE: 80 MMHG | BODY MASS INDEX: 33.13 KG/M2

## 2025-06-18 DIAGNOSIS — Z30.015 ENCOUNTER FOR INITIAL PRESCRIPTION OF VAGINAL RING HORMONAL CONTRACEPTIVE: ICD-10-CM

## 2025-06-18 DIAGNOSIS — M62.89 PELVIC FLOOR DYSFUNCTION: ICD-10-CM

## 2025-06-18 DIAGNOSIS — G89.29 CHRONIC MIDLINE LOW BACK PAIN, UNSPECIFIED WHETHER SCIATICA PRESENT: ICD-10-CM

## 2025-06-18 DIAGNOSIS — Z01.419 WELL WOMAN EXAM WITH ROUTINE GYNECOLOGICAL EXAM: Primary | ICD-10-CM

## 2025-06-18 DIAGNOSIS — M54.50 CHRONIC MIDLINE LOW BACK PAIN, UNSPECIFIED WHETHER SCIATICA PRESENT: ICD-10-CM

## 2025-06-18 PROCEDURE — 99395 PREV VISIT EST AGE 18-39: CPT | Performed by: OBSTETRICS & GYNECOLOGY

## 2025-06-18 RX ORDER — ETONOGESTREL AND ETHINYL ESTRADIOL VAGINAL RING .015; .12 MG/D; MG/D
RING VAGINAL
Qty: 3 EACH | Refills: 4 | Status: SHIPPED | OUTPATIENT
Start: 2025-06-18 | End: 2025-06-23 | Stop reason: SDUPTHER

## 2025-06-18 NOTE — PROGRESS NOTES
ASSESSMENT & PLAN:   Diagnoses and all orders for this visit:    Well woman exam with routine gynecological exam    Encounter for initial prescription of vaginal ring hormonal contraceptive  -     etonogestrel-ethinyl estradiol (EluRyng) 0.12-0.015 MG/24HR vaginal ring; Insert vaginally and leave in place for 3 consecutive weeks, then remove for 1 week.    Pelvic floor dysfunction  -     Cancel: Ambulatory Referral to Physical Therapy; Future  -     Ambulatory Referral to Physical Therapy; Future    Chronic midline low back pain, unspecified whether sciatica present  -     Cancel: Ambulatory Referral to Physical Therapy; Future  -     Ambulatory Referral to Physical Therapy; Future          The following were reviewed in today's visit: ASCCP guidelines, Gardasil vaccination, STD testing breast self exam, use and side effects of OCPs, family planning choices, exercise, and healthy diet.    Patient to return to office in yearly for annual exam.     All questions have been answered to her satisfaction.        CC:  Annual Gynecologic Examination  Chief Complaint   Patient presents with    Gynecologic Exam     Annual exam, pap not indicated. Pt is well, stopped ocp because of milk supply when breast feeding and breakthrough bleeding. Pt would like to use nuvaring.   Dlvd 2024 Vag-froy Barnett, with Dr. Lexi Larsen pap 21, 24  colonoscopy 2021       HPI: Cale CARVALHO Regis is a 29 y.o.  who presents for annual gynecologic examination.  She has the following concerns:  discontinued OCP due to breastmilk supply declining and breakthrough bleeding.  Interested in using NuvaRing for contraception. Used NuvaRIng in the past with satisfactory results and ease.     Cale has been experiencing continued back pain since her pregnancy. She also has pelvic pressure sensation with squatting and valsalva maneuvers increase the perineal and pelvic pressure sensation. Denies leakage of urine. Reviewed recommendation  for pelvic floor PT.       Health Maintenance:    Exercise: frequently  Breast exams/breast awareness: yes    Past Medical History[1]    Past Surgical History[2]    Past OB/Gyn History:  Period Cycle (Days):  (28-30)  Period Duration (Days): 5-6  Period Pattern: Regular  Menstrual Flow: Light, Moderate, Heavy  Menstrual Control: Tampon, Maxi pad, Thin pad  Dysmenorrhea: NonePatient's last menstrual period was 05/31/2025 (exact date).    Last Pap: 2024 : no abnormalities  History of abnormal Pap smear: no  HPV vaccine completed: no    Patient is currently sexually active.   STD testing: no  Current contraception:condoms      Family History  Family History[3]    Family history of uterine or ovarian cancer: no  Family history of breast cancer: no  Family history of colon cancer: no    Social History:  Social History     Socioeconomic History    Marital status: /Civil Union     Spouse name: Not on file    Number of children: Not on file    Years of education: Not on file    Highest education level: Not on file   Occupational History    Not on file   Tobacco Use    Smoking status: Never    Smokeless tobacco: Never   Vaping Use    Vaping status: Never Used   Substance and Sexual Activity    Alcohol use: Yes     Alcohol/week: 1.0 standard drink of alcohol     Types: 1 Glasses of wine per week     Comment: Social activities    Drug use: Never     Comment: Denies    Sexual activity: Yes     Partners: Male     Birth control/protection: None, Coitus interruptus, Condom Male   Other Topics Concern    Not on file   Social History Narrative    Not on file     Social Drivers of Health     Financial Resource Strain: Low Risk  (3/6/2023)    Received from [facility]    Overall Financial Resource Strain (CARDIA)     Difficulty of Paying Living Expenses: Not very hard   Food Insecurity: No Food Insecurity (12/4/2024)    Nursing - Inadequate Food Risk Classification     Worried About Running Out of Food in the Last Year: Not  on file     Ran Out of Food in the Last Year: Not on file     Ran Out of Food in the Last Year: Never true   Transportation Needs: No Transportation Needs (12/4/2024)    Nursing - Transportation Risk Classification     Lack of Transportation: Not on file     Lack of Transportation: No   Physical Activity: Sufficiently Active (3/13/2025)    Received from [facility]    Exercise Vital Sign     On average, how many days per week do you engage in moderate to strenuous exercise (like a brisk walk)?: 7 days     On average, how many minutes do you engage in exercise at this level?: 30 min   Stress: Stress Concern Present (3/22/2022)    Received from [facility]    [employer] [place] [employer] - Occupational Stress Questionnaire     Feeling of Stress : To some extent   Social Connections: Moderately Isolated (3/13/2025)    Received from [facility]    Social Connection and Isolation Panel     In a typical week, how many times do you talk on the phone with family, friends, or neighbors?: More than three times a week     How often do you get together with friends or relatives?: More than three times a week     How often do you attend Jewish or Scientology services?: Never     Do you belong to any clubs or organizations such as Jewish groups, unions, fraternal or athletic groups, or school groups?: No     How often do you attend meetings of the clubs or organizations you belong to?: Never     Are you , , , , never , or living with a partner?:    Intimate Partner Violence: Not At Risk (3/13/2025)    Received from [facility]    Humiliation, Afraid, Rape, and Kick questionnaire     Within the last year, have you been afraid of your partner or ex-partner?: No     Within the last year, have you been humiliated or emotionally abused in other ways by your partner or ex-partner?: No     Within the last year, have you been kicked, hit, slapped, or otherwise  "physically hurt by your partner or ex-partner?: No     Within the last year, have you been raped or forced to have any kind of sexual activity by your partner or ex-partner?: No   Housing Stability: Unknown (12/4/2024)    Nursing: Inadequate Housing Risk Classification     Has Housing: Not on file     Worried About Losing Housing: Not on file     Unable to Get Utilities: Not on file     Unable to Pay for Housing in the Last Year: No     Has Housing: No   Recent Concern: Housing Stability - At Risk (12/2/2024)    Nursing: Inadequate Housing Risk Classification     Has Housing: Not on file     Worried About Losing Housing: Not on file     Unable to Get Utilities: Not on file     Unable to Pay for Housing in the Last Year: Yes     Has Housing: Yes     Domestic violence screen: negative    Allergies:  Allergies[4]    Medications:  Current Medications[5]    Review of Systems:  Review of Systems   Constitutional:  Negative for activity change, appetite change and unexpected weight change.   Respiratory:  Negative for cough and shortness of breath.    Cardiovascular:  Negative for chest pain.   Gastrointestinal:  Negative for abdominal pain, constipation, diarrhea, nausea and vomiting.   Genitourinary:  Negative for difficulty urinating, dyspareunia, frequency, menstrual problem, pelvic pain, urgency, vaginal bleeding, vaginal discharge and vaginal pain.   Musculoskeletal:  Negative for back pain.   Skin: Negative.    Neurological:  Negative for dizziness, weakness, light-headedness and headaches.   Psychiatric/Behavioral: Negative.           Physical Exam:  /80 (BP Location: Left arm, Patient Position: Sitting, Cuff Size: Standard)   Ht 5' 2\" (1.575 m)   Wt 81.6 kg (180 lb)   LMP 05/31/2025 (Exact Date)   Breastfeeding No   BMI 32.92 kg/m²    Physical Exam  Constitutional:       General: She is not in acute distress.     Appearance: Normal appearance. She is well-developed. She is not diaphoretic. "   Genitourinary:      Vulva and bladder normal.      No lesions in the vagina.      Genitourinary Comments: Minimal descent of vaginal walls and cervix with valsalva.       Right Labia: No rash, tenderness or lesions.     Left Labia: No tenderness, lesions or rash.     No inguinal adenopathy present in the right or left side.     No vaginal discharge, erythema, tenderness or bleeding.      No vaginal prolapse present.     No vaginal atrophy present.       Right Adnexa: not tender, not full and no mass present.     Left Adnexa: not tender, not full and no mass present.     Cervix is parous.      No cervical motion tenderness, discharge, friability, lesion or polyp.      No parametrium nodularity or thickening present.     Uterus is not enlarged, tender or prolapsed.      No uterine mass detected.     Uterus is midaxial.      No urethral prolapse or mass present.      Bladder is not tender.       Pelvic exam was performed with patient in the lithotomy position.   Rectum:      No tenderness or external hemorrhoid.   Breasts:     Breasts are symmetrical.      Right: No swelling, bleeding, mass, skin change or tenderness.      Left: No swelling, bleeding, mass, skin change or tenderness.   HENT:      Head: Normocephalic and atraumatic.   Neck:      Thyroid: No thyromegaly or thyroid tenderness.     Cardiovascular:      Rate and Rhythm: Normal rate and regular rhythm.      Heart sounds: Normal heart sounds. No murmur heard.     No friction rub.   Pulmonary:      Effort: Pulmonary effort is normal. No respiratory distress.      Breath sounds: Normal breath sounds. No wheezing or rales.   Abdominal:      Palpations: Abdomen is soft. There is no mass.      Tenderness: There is no abdominal tenderness. There is no guarding.     Musculoskeletal:         General: No tenderness. Normal range of motion.      Right lower leg: No edema.      Left lower leg: No edema.   Lymphadenopathy:      Lower Body: No right inguinal adenopathy.  No left inguinal adenopathy.     Neurological:      Mental Status: She is alert and oriented to person, place, and time.     Skin:     General: Skin is warm and dry.      Coloration: Skin is not pale.      Findings: No erythema.     Psychiatric:         Mood and Affect: Mood normal.         Behavior: Behavior normal.         Thought Content: Thought content normal.         Judgment: Judgment normal.   Vitals and nursing note reviewed.                [1]   Past Medical History:  Diagnosis Date    Allergic rhinitis     Scoliosis      (spontaneous vaginal delivery) 2024    Varicella     vaccinated   [2]   Past Surgical History:  Procedure Laterality Date    COLONOSCOPY      NOSE SURGERY         [3]   Family History  Problem Relation Name Age of Onset    Anemia Mother      Glaucoma Father      No Known Problems Sister      Ovarian cancer Maternal Grandmother Kandi Alejandra         She had this at a young age maybe 50's    Diabetes Maternal Grandmother Kandi Alejandra         she drank coke-a-cola her whole life    Breast cancer Paternal Grandmother Eloise Sifuentes         she is cancer free but still takes medicine    Heart disease Paternal Grandfather Cale Sifuentes     Cancer Paternal Grandfather Cale Sifuentes         Lung cancer    Colon cancer Neg Hx     [4]   Allergies  Allergen Reactions    Amoxicillin-Pot Clavulanate Diarrhea   [5]   Current Outpatient Medications:     etonogestrel-ethinyl estradiol (EluRyng) 0.12-0.015 MG/24HR vaginal ring, Insert vaginally and leave in place for 3 consecutive weeks, then remove for 1 week., Disp: 3 each, Rfl: 4

## 2025-06-23 DIAGNOSIS — Z30.015 ENCOUNTER FOR INITIAL PRESCRIPTION OF VAGINAL RING HORMONAL CONTRACEPTIVE: ICD-10-CM

## 2025-06-23 NOTE — TELEPHONE ENCOUNTER
Reason for call:   [x] Refill   [] Prior Auth  [x] Other: Not a duplicate, pharmacy change. [facility] does not fill this    Office:   [] PCP/Provider -   [x] Specialty/Provider - [facility]     Medication: etonogestrel-ethinyl estradiol (EluRyng) 0.12-0.015 MG/24HR vaginal ring     Dose/Frequency: Insert vaginally and leave in place for 3 consecutive weeks, then remove for 1 week     Quantity: 3    [facility]    Local Pharmacy   Does the patient have enough for 3 days?   [x] Yes   [] No - Send as HP to POD    Mail Away Pharmacy   Does the patient have enough for 10 days?   [] Yes   [] No - Send as HP to POD

## 2025-06-24 ENCOUNTER — TELEPHONE (OUTPATIENT)
Age: 30
End: 2025-06-24

## 2025-06-24 RX ORDER — ETONOGESTREL AND ETHINYL ESTRADIOL VAGINAL RING .015; .12 MG/D; MG/D
RING VAGINAL
Qty: 3 EACH | Refills: 1 | Status: SHIPPED | OUTPATIENT
Start: 2025-06-24

## 2025-06-24 NOTE — TELEPHONE ENCOUNTER
PA for EluRyng 0.12-0.015mg SUBMITTED to [employer]    via    []CMM-KEY:   [x]Surescripts-Case ID #   []Availity-Auth ID # NDC #   []Faxed to plan   []Other website   []Phone call Case ID #     []PA sent as URGENT    All office notes, labs and other pertaining documents and studies sent. Clinical questions answered. Awaiting determination from insurance company.     Turnaround time for your insurance to make a decision on your Prior Authorization can take 7-21 business days.

## 2025-06-24 NOTE — TELEPHONE ENCOUNTER
PA for Eluryng 0.12-0.015mg  APPROVED     Date(s) approved 05/25/25-06/24/2026    Case #    Patient advised by          [x]MyChart Message  []Phone call   []LMOM  []L/M to call office as no active Communication consent on file  []Unable to leave detailed message as VM not approved on Communication consent       Pharmacy advised by    [x]Fax  []Phone call  []Secure Chat    Specialty Pharmacy    []     Approval letter scanned into Media

## 2025-07-29 ENCOUNTER — EVALUATION (OUTPATIENT)
Dept: PHYSICAL THERAPY | Facility: CLINIC | Age: 30
End: 2025-07-29
Payer: COMMERCIAL

## 2025-07-29 DIAGNOSIS — M62.89 PELVIC FLOOR DYSFUNCTION: ICD-10-CM

## 2025-07-29 DIAGNOSIS — G89.29 CHRONIC MIDLINE LOW BACK PAIN, UNSPECIFIED WHETHER SCIATICA PRESENT: ICD-10-CM

## 2025-07-29 DIAGNOSIS — M54.50 CHRONIC MIDLINE LOW BACK PAIN, UNSPECIFIED WHETHER SCIATICA PRESENT: ICD-10-CM

## 2025-07-29 PROCEDURE — 97162 PT EVAL MOD COMPLEX 30 MIN: CPT

## 2025-07-29 PROCEDURE — 97530 THERAPEUTIC ACTIVITIES: CPT

## 2025-08-04 ENCOUNTER — OFFICE VISIT (OUTPATIENT)
Dept: PHYSICAL THERAPY | Facility: CLINIC | Age: 30
End: 2025-08-04
Attending: OBSTETRICS & GYNECOLOGY
Payer: COMMERCIAL

## 2025-08-04 DIAGNOSIS — M62.89 PELVIC FLOOR DYSFUNCTION: Primary | ICD-10-CM

## 2025-08-04 DIAGNOSIS — M54.50 CHRONIC MIDLINE LOW BACK PAIN, UNSPECIFIED WHETHER SCIATICA PRESENT: ICD-10-CM

## 2025-08-04 DIAGNOSIS — G89.29 CHRONIC MIDLINE LOW BACK PAIN, UNSPECIFIED WHETHER SCIATICA PRESENT: ICD-10-CM

## 2025-08-04 PROCEDURE — 97110 THERAPEUTIC EXERCISES: CPT

## 2025-08-04 PROCEDURE — 97140 MANUAL THERAPY 1/> REGIONS: CPT

## 2025-08-04 PROCEDURE — 97112 NEUROMUSCULAR REEDUCATION: CPT

## 2025-08-13 ENCOUNTER — OFFICE VISIT (OUTPATIENT)
Dept: PHYSICAL THERAPY | Facility: CLINIC | Age: 30
End: 2025-08-13
Attending: OBSTETRICS & GYNECOLOGY
Payer: COMMERCIAL